# Patient Record
Sex: FEMALE | Race: BLACK OR AFRICAN AMERICAN | NOT HISPANIC OR LATINO
[De-identification: names, ages, dates, MRNs, and addresses within clinical notes are randomized per-mention and may not be internally consistent; named-entity substitution may affect disease eponyms.]

---

## 2017-01-05 ENCOUNTER — APPOINTMENT (OUTPATIENT)
Dept: CARDIOLOGY | Facility: CLINIC | Age: 37
End: 2017-01-05

## 2017-01-05 ENCOUNTER — NON-APPOINTMENT (OUTPATIENT)
Age: 37
End: 2017-01-05

## 2017-01-05 VITALS
DIASTOLIC BLOOD PRESSURE: 70 MMHG | BODY MASS INDEX: 27.48 KG/M2 | WEIGHT: 171 LBS | HEIGHT: 66 IN | SYSTOLIC BLOOD PRESSURE: 120 MMHG

## 2017-01-05 DIAGNOSIS — Z78.9 OTHER SPECIFIED HEALTH STATUS: ICD-10-CM

## 2017-01-05 DIAGNOSIS — I42.9 CARDIOMYOPATHY, UNSPECIFIED: ICD-10-CM

## 2017-01-05 DIAGNOSIS — I10 ESSENTIAL (PRIMARY) HYPERTENSION: ICD-10-CM

## 2017-01-06 ENCOUNTER — APPOINTMENT (OUTPATIENT)
Dept: CARDIOLOGY | Facility: CLINIC | Age: 37
End: 2017-01-06

## 2017-11-05 ENCOUNTER — RESULT REVIEW (OUTPATIENT)
Age: 37
End: 2017-11-05

## 2017-11-05 ENCOUNTER — EMERGENCY (EMERGENCY)
Facility: HOSPITAL | Age: 37
LOS: 1 days | Discharge: STILL ADMITTED OR ADMINISTRATIVE | End: 2017-11-05
Admitting: EMERGENCY MEDICINE

## 2017-11-05 ENCOUNTER — INPATIENT (INPATIENT)
Facility: HOSPITAL | Age: 37
LOS: 3 days | Discharge: HOME | End: 2017-11-09
Attending: OBSTETRICS & GYNECOLOGY | Admitting: OBSTETRICS & GYNECOLOGY
Payer: COMMERCIAL

## 2017-11-05 VITALS
RESPIRATION RATE: 16 BRPM | WEIGHT: 171.96 LBS | OXYGEN SATURATION: 100 % | HEIGHT: 66 IN | SYSTOLIC BLOOD PRESSURE: 112 MMHG | HEART RATE: 85 BPM | DIASTOLIC BLOOD PRESSURE: 72 MMHG | TEMPERATURE: 98 F

## 2017-11-05 DIAGNOSIS — O26.899 OTHER SPECIFIED PREGNANCY RELATED CONDITIONS, UNSPECIFIED TRIMESTER: ICD-10-CM

## 2017-11-05 DIAGNOSIS — Z3A.00 WEEKS OF GESTATION OF PREGNANCY NOT SPECIFIED: ICD-10-CM

## 2017-11-05 NOTE — ED ADULT TRIAGE NOTE - CHIEF COMPLAINT QUOTE
Pt is 25 weeks pregnant BREANNE feb 14th  with L lower quadrant pain and a feeling of "tightening of the uterus" which began last evening, able to tolerate PO, denies spotting, any leaking, N/V/D, denies any urinary symptoms, PMH, pregnancy induced cardiomyopathy, HTN

## 2017-11-06 VITALS — WEIGHT: 169.76 LBS | HEIGHT: 64 IN

## 2017-11-06 DIAGNOSIS — I10 ESSENTIAL (PRIMARY) HYPERTENSION: ICD-10-CM

## 2017-11-06 DIAGNOSIS — O45.90 PREMATURE SEPARATION OF PLACENTA, UNSPECIFIED, UNSPECIFIED TRIMESTER: ICD-10-CM

## 2017-11-06 DIAGNOSIS — I42.8 OTHER CARDIOMYOPATHIES: ICD-10-CM

## 2017-11-06 DIAGNOSIS — I81 PORTAL VEIN THROMBOSIS: ICD-10-CM

## 2017-11-06 DIAGNOSIS — I42.9 CARDIOMYOPATHY, UNSPECIFIED: ICD-10-CM

## 2017-11-06 LAB
ALBUMIN SERPL ELPH-MCNC: 3.3 G/DL — SIGNIFICANT CHANGE UP (ref 3.3–5)
ALP SERPL-CCNC: 62 U/L — SIGNIFICANT CHANGE UP (ref 40–120)
ALT FLD-CCNC: 13 U/L — SIGNIFICANT CHANGE UP (ref 4–33)
APPEARANCE UR: CLEAR — SIGNIFICANT CHANGE UP
APTT BLD: 26.1 SEC — LOW (ref 27.5–37.4)
AST SERPL-CCNC: 17 U/L — SIGNIFICANT CHANGE UP (ref 4–32)
BACTERIA # UR AUTO: SIGNIFICANT CHANGE UP
BASOPHILS # BLD AUTO: 0.01 K/UL — SIGNIFICANT CHANGE UP (ref 0–0.2)
BASOPHILS # BLD AUTO: 0.02 K/UL — SIGNIFICANT CHANGE UP (ref 0–0.2)
BASOPHILS NFR BLD AUTO: 0.1 % — SIGNIFICANT CHANGE UP (ref 0–2)
BASOPHILS NFR BLD AUTO: 0.3 % — SIGNIFICANT CHANGE UP (ref 0–2)
BILIRUB SERPL-MCNC: < 0.2 MG/DL — LOW (ref 0.2–1.2)
BILIRUB UR-MCNC: NEGATIVE — SIGNIFICANT CHANGE UP
BLD GP AB SCN SERPL QL: NEGATIVE — SIGNIFICANT CHANGE UP
BLOOD UR QL VISUAL: NEGATIVE — SIGNIFICANT CHANGE UP
BUN SERPL-MCNC: 6 MG/DL — LOW (ref 7–23)
CALCIUM SERPL-MCNC: 8.3 MG/DL — LOW (ref 8.4–10.5)
CHLORIDE SERPL-SCNC: 105 MMOL/L — SIGNIFICANT CHANGE UP (ref 98–107)
CHOLEST SERPL-MCNC: 242 MG/DL — HIGH (ref 120–199)
CO2 SERPL-SCNC: 19 MMOL/L — LOW (ref 22–31)
COLOR SPEC: SIGNIFICANT CHANGE UP
CREAT SERPL-MCNC: 0.45 MG/DL — LOW (ref 0.5–1.3)
EOSINOPHIL # BLD AUTO: 0 K/UL — SIGNIFICANT CHANGE UP (ref 0–0.5)
EOSINOPHIL # BLD AUTO: 0.07 K/UL — SIGNIFICANT CHANGE UP (ref 0–0.5)
EOSINOPHIL NFR BLD AUTO: 0 % — SIGNIFICANT CHANGE UP (ref 0–6)
EOSINOPHIL NFR BLD AUTO: 1.1 % — SIGNIFICANT CHANGE UP (ref 0–6)
FIBRINOGEN PPP-MCNC: 596.2 MG/DL — HIGH (ref 310–510)
GLUCOSE SERPL-MCNC: 137 MG/DL — HIGH (ref 70–99)
GLUCOSE UR-MCNC: NEGATIVE — SIGNIFICANT CHANGE UP
HBA1C BLD-MCNC: 5.4 % — SIGNIFICANT CHANGE UP (ref 4–5.6)
HCT VFR BLD CALC: 30.1 % — LOW (ref 34.5–45)
HCT VFR BLD CALC: 31.4 % — LOW (ref 34.5–45)
HDLC SERPL-MCNC: 111 MG/DL — HIGH (ref 45–65)
HGB BLD-MCNC: 10.3 G/DL — LOW (ref 11.5–15.5)
HGB BLD-MCNC: 10.6 G/DL — LOW (ref 11.5–15.5)
IMM GRANULOCYTES # BLD AUTO: 0.02 # — SIGNIFICANT CHANGE UP
IMM GRANULOCYTES # BLD AUTO: 0.03 # — SIGNIFICANT CHANGE UP
IMM GRANULOCYTES NFR BLD AUTO: 0.3 % — SIGNIFICANT CHANGE UP (ref 0–1.5)
IMM GRANULOCYTES NFR BLD AUTO: 0.3 % — SIGNIFICANT CHANGE UP (ref 0–1.5)
INR BLD: 0.94 — SIGNIFICANT CHANGE UP (ref 0.88–1.17)
KETONES UR-MCNC: NEGATIVE — SIGNIFICANT CHANGE UP
LEUKOCYTE ESTERASE UR-ACNC: HIGH
LIPID PNL WITH DIRECT LDL SERPL: 126 MG/DL — SIGNIFICANT CHANGE UP
LYMPHOCYTES # BLD AUTO: 0.7 K/UL — LOW (ref 1–3.3)
LYMPHOCYTES # BLD AUTO: 1.6 K/UL — SIGNIFICANT CHANGE UP (ref 1–3.3)
LYMPHOCYTES # BLD AUTO: 25.5 % — SIGNIFICANT CHANGE UP (ref 13–44)
LYMPHOCYTES # BLD AUTO: 7.2 % — LOW (ref 13–44)
MAGNESIUM SERPL-MCNC: 1.7 MG/DL — SIGNIFICANT CHANGE UP (ref 1.6–2.6)
MCHC RBC-ENTMCNC: 30.5 PG — SIGNIFICANT CHANGE UP (ref 27–34)
MCHC RBC-ENTMCNC: 30.6 PG — SIGNIFICANT CHANGE UP (ref 27–34)
MCHC RBC-ENTMCNC: 33.8 % — SIGNIFICANT CHANGE UP (ref 32–36)
MCHC RBC-ENTMCNC: 34.2 % — SIGNIFICANT CHANGE UP (ref 32–36)
MCV RBC AUTO: 89.1 FL — SIGNIFICANT CHANGE UP (ref 80–100)
MCV RBC AUTO: 90.8 FL — SIGNIFICANT CHANGE UP (ref 80–100)
MONOCYTES # BLD AUTO: 0.29 K/UL — SIGNIFICANT CHANGE UP (ref 0–0.9)
MONOCYTES # BLD AUTO: 0.59 K/UL — SIGNIFICANT CHANGE UP (ref 0–0.9)
MONOCYTES NFR BLD AUTO: 3 % — SIGNIFICANT CHANGE UP (ref 2–14)
MONOCYTES NFR BLD AUTO: 9.4 % — SIGNIFICANT CHANGE UP (ref 2–14)
MUCOUS THREADS # UR AUTO: SIGNIFICANT CHANGE UP
NEUTROPHILS # BLD AUTO: 3.98 K/UL — SIGNIFICANT CHANGE UP (ref 1.8–7.4)
NEUTROPHILS # BLD AUTO: 8.63 K/UL — HIGH (ref 1.8–7.4)
NEUTROPHILS NFR BLD AUTO: 63.4 % — SIGNIFICANT CHANGE UP (ref 43–77)
NEUTROPHILS NFR BLD AUTO: 89.4 % — HIGH (ref 43–77)
NITRITE UR-MCNC: NEGATIVE — SIGNIFICANT CHANGE UP
NRBC # FLD: 0 — SIGNIFICANT CHANGE UP
NRBC # FLD: 0 — SIGNIFICANT CHANGE UP
NT-PROBNP SERPL-SCNC: 19.14 PG/ML — SIGNIFICANT CHANGE UP
PH UR: 6.5 — SIGNIFICANT CHANGE UP (ref 4.6–8)
PHOSPHATE SERPL-MCNC: 3 MG/DL — SIGNIFICANT CHANGE UP (ref 2.5–4.5)
PLATELET # BLD AUTO: 200 K/UL — SIGNIFICANT CHANGE UP (ref 150–400)
PLATELET # BLD AUTO: 212 K/UL — SIGNIFICANT CHANGE UP (ref 150–400)
PMV BLD: 11.2 FL — SIGNIFICANT CHANGE UP (ref 7–13)
PMV BLD: 11.8 FL — SIGNIFICANT CHANGE UP (ref 7–13)
POTASSIUM SERPL-MCNC: 4.3 MMOL/L — SIGNIFICANT CHANGE UP (ref 3.5–5.3)
POTASSIUM SERPL-SCNC: 4.3 MMOL/L — SIGNIFICANT CHANGE UP (ref 3.5–5.3)
PROT SERPL-MCNC: 6.4 G/DL — SIGNIFICANT CHANGE UP (ref 6–8.3)
PROT UR-MCNC: NEGATIVE — SIGNIFICANT CHANGE UP
PROTHROM AB SERPL-ACNC: 10.5 SEC — SIGNIFICANT CHANGE UP (ref 9.8–13.1)
RBC # BLD: 3.38 M/UL — LOW (ref 3.8–5.2)
RBC # BLD: 3.46 M/UL — LOW (ref 3.8–5.2)
RBC # FLD: 13.2 % — SIGNIFICANT CHANGE UP (ref 10.3–14.5)
RBC # FLD: 13.4 % — SIGNIFICANT CHANGE UP (ref 10.3–14.5)
RBC CASTS # UR COMP ASSIST: SIGNIFICANT CHANGE UP (ref 0–?)
RH IG SCN BLD-IMP: POSITIVE — SIGNIFICANT CHANGE UP
SODIUM SERPL-SCNC: 137 MMOL/L — SIGNIFICANT CHANGE UP (ref 135–145)
SP GR SPEC: 1.01 — SIGNIFICANT CHANGE UP (ref 1–1.03)
SQUAMOUS # UR AUTO: SIGNIFICANT CHANGE UP
T PALLIDUM AB TITR SER: NEGATIVE — SIGNIFICANT CHANGE UP
TRIGL SERPL-MCNC: 69 MG/DL — SIGNIFICANT CHANGE UP (ref 10–149)
TSH SERPL-MCNC: 0.29 UIU/ML — SIGNIFICANT CHANGE UP (ref 0.27–4.2)
UROBILINOGEN FLD QL: NORMAL E.U. — SIGNIFICANT CHANGE UP (ref 0.1–0.2)
WBC # BLD: 6.28 K/UL — SIGNIFICANT CHANGE UP (ref 3.8–10.5)
WBC # BLD: 9.66 K/UL — SIGNIFICANT CHANGE UP (ref 3.8–10.5)
WBC # FLD AUTO: 6.28 K/UL — SIGNIFICANT CHANGE UP (ref 3.8–10.5)
WBC # FLD AUTO: 9.66 K/UL — SIGNIFICANT CHANGE UP (ref 3.8–10.5)
WBC UR QL: SIGNIFICANT CHANGE UP (ref 0–?)

## 2017-11-06 PROCEDURE — 93306 TTE W/DOPPLER COMPLETE: CPT | Mod: 26

## 2017-11-06 PROCEDURE — 93010 ELECTROCARDIOGRAM REPORT: CPT

## 2017-11-06 PROCEDURE — 88307 TISSUE EXAM BY PATHOLOGIST: CPT | Mod: 26

## 2017-11-06 RX ORDER — OXYCODONE HYDROCHLORIDE 5 MG/1
5 TABLET ORAL EVERY 4 HOURS
Qty: 0 | Refills: 0 | Status: DISCONTINUED | OUTPATIENT
Start: 2017-11-06 | End: 2017-11-09

## 2017-11-06 RX ORDER — OXYCODONE HYDROCHLORIDE 5 MG/1
5 TABLET ORAL
Qty: 0 | Refills: 0 | Status: DISCONTINUED | OUTPATIENT
Start: 2017-11-06 | End: 2017-11-09

## 2017-11-06 RX ORDER — HEPARIN SODIUM 5000 [USP'U]/ML
10000 INJECTION INTRAVENOUS; SUBCUTANEOUS EVERY 12 HOURS
Qty: 0 | Refills: 0 | Status: DISCONTINUED | OUTPATIENT
Start: 2017-11-06 | End: 2017-11-07

## 2017-11-06 RX ORDER — OXYCODONE HYDROCHLORIDE 5 MG/1
5 TABLET ORAL EVERY 4 HOURS
Qty: 0 | Refills: 0 | Status: DISCONTINUED | OUTPATIENT
Start: 2017-11-06 | End: 2017-11-06

## 2017-11-06 RX ORDER — AMPICILLIN TRIHYDRATE 250 MG
CAPSULE ORAL
Qty: 0 | Refills: 0 | Status: DISCONTINUED | OUTPATIENT
Start: 2017-11-06 | End: 2017-11-06

## 2017-11-06 RX ORDER — CARVEDILOL PHOSPHATE 80 MG/1
6.25 CAPSULE, EXTENDED RELEASE ORAL EVERY 12 HOURS
Qty: 0 | Refills: 0 | Status: DISCONTINUED | OUTPATIENT
Start: 2017-11-06 | End: 2017-11-09

## 2017-11-06 RX ORDER — SODIUM CHLORIDE 9 MG/ML
1000 INJECTION, SOLUTION INTRAVENOUS
Qty: 0 | Refills: 0 | Status: DISCONTINUED | OUTPATIENT
Start: 2017-11-06 | End: 2017-11-09

## 2017-11-06 RX ORDER — LABETALOL HCL 100 MG
200 TABLET ORAL
Qty: 0 | Refills: 0 | Status: DISCONTINUED | OUTPATIENT
Start: 2017-11-06 | End: 2017-11-06

## 2017-11-06 RX ORDER — IBUPROFEN 200 MG
600 TABLET ORAL EVERY 6 HOURS
Qty: 0 | Refills: 0 | Status: DISCONTINUED | OUTPATIENT
Start: 2017-11-06 | End: 2017-11-09

## 2017-11-06 RX ORDER — ONDANSETRON 8 MG/1
4 TABLET, FILM COATED ORAL ONCE
Qty: 0 | Refills: 0 | Status: COMPLETED | OUTPATIENT
Start: 2017-11-06 | End: 2017-11-06

## 2017-11-06 RX ORDER — SODIUM CHLORIDE 9 MG/ML
1000 INJECTION, SOLUTION INTRAVENOUS ONCE
Qty: 0 | Refills: 0 | Status: DISCONTINUED | OUTPATIENT
Start: 2017-11-06 | End: 2017-11-06

## 2017-11-06 RX ORDER — KETOROLAC TROMETHAMINE 30 MG/ML
30 SYRINGE (ML) INJECTION EVERY 6 HOURS
Qty: 0 | Refills: 0 | Status: DISCONTINUED | OUTPATIENT
Start: 2017-11-06 | End: 2017-11-07

## 2017-11-06 RX ORDER — TETANUS TOXOID, REDUCED DIPHTHERIA TOXOID AND ACELLULAR PERTUSSIS VACCINE, ADSORBED 5; 2.5; 8; 8; 2.5 [IU]/.5ML; [IU]/.5ML; UG/.5ML; UG/.5ML; UG/.5ML
0.5 SUSPENSION INTRAMUSCULAR ONCE
Qty: 0 | Refills: 0 | Status: COMPLETED | OUTPATIENT
Start: 2017-11-06

## 2017-11-06 RX ORDER — LANOLIN
1 OINTMENT (GRAM) TOPICAL
Qty: 0 | Refills: 0 | Status: DISCONTINUED | OUTPATIENT
Start: 2017-11-06 | End: 2017-11-09

## 2017-11-06 RX ORDER — CITRIC ACID/SODIUM CITRATE 300-500 MG
30 SOLUTION, ORAL ORAL ONCE
Qty: 0 | Refills: 0 | Status: DISCONTINUED | OUTPATIENT
Start: 2017-11-06 | End: 2017-11-06

## 2017-11-06 RX ORDER — AMPICILLIN TRIHYDRATE 250 MG
1 CAPSULE ORAL EVERY 4 HOURS
Qty: 0 | Refills: 0 | Status: DISCONTINUED | OUTPATIENT
Start: 2017-11-06 | End: 2017-11-06

## 2017-11-06 RX ORDER — LABETALOL HCL 100 MG
200 TABLET ORAL
Qty: 0 | Refills: 0 | Status: DISCONTINUED | OUTPATIENT
Start: 2017-11-06 | End: 2017-11-09

## 2017-11-06 RX ORDER — SODIUM CHLORIDE 9 MG/ML
500 INJECTION, SOLUTION INTRAVENOUS ONCE
Qty: 0 | Refills: 0 | Status: COMPLETED | OUTPATIENT
Start: 2017-11-06 | End: 2017-11-06

## 2017-11-06 RX ORDER — DOCUSATE SODIUM 100 MG
100 CAPSULE ORAL
Qty: 0 | Refills: 0 | Status: DISCONTINUED | OUTPATIENT
Start: 2017-11-06 | End: 2017-11-09

## 2017-11-06 RX ORDER — AMPICILLIN TRIHYDRATE 250 MG
2 CAPSULE ORAL ONCE
Qty: 0 | Refills: 0 | Status: DISCONTINUED | OUTPATIENT
Start: 2017-11-06 | End: 2017-11-06

## 2017-11-06 RX ORDER — MAGNESIUM SULFATE 500 MG/ML
4 VIAL (ML) INJECTION ONCE
Qty: 0 | Refills: 0 | Status: COMPLETED | OUTPATIENT
Start: 2017-11-06 | End: 2017-11-06

## 2017-11-06 RX ORDER — LABETALOL HCL 100 MG
1 TABLET ORAL
Qty: 0 | Refills: 0 | COMMUNITY

## 2017-11-06 RX ORDER — OXYTOCIN 10 UNIT/ML
333.33 VIAL (ML) INJECTION
Qty: 20 | Refills: 0 | Status: DISCONTINUED | OUTPATIENT
Start: 2017-11-06 | End: 2017-11-06

## 2017-11-06 RX ORDER — GLYCERIN ADULT
1 SUPPOSITORY, RECTAL RECTAL AT BEDTIME
Qty: 0 | Refills: 0 | Status: DISCONTINUED | OUTPATIENT
Start: 2017-11-06 | End: 2017-11-09

## 2017-11-06 RX ORDER — OXYCODONE HYDROCHLORIDE 5 MG/1
10 TABLET ORAL EVERY 4 HOURS
Qty: 0 | Refills: 0 | Status: DISCONTINUED | OUTPATIENT
Start: 2017-11-06 | End: 2017-11-06

## 2017-11-06 RX ORDER — FERROUS SULFATE 325(65) MG
325 TABLET ORAL DAILY
Qty: 0 | Refills: 0 | Status: DISCONTINUED | OUTPATIENT
Start: 2017-11-06 | End: 2017-11-09

## 2017-11-06 RX ORDER — SODIUM CHLORIDE 9 MG/ML
1000 INJECTION, SOLUTION INTRAVENOUS
Qty: 0 | Refills: 0 | Status: DISCONTINUED | OUTPATIENT
Start: 2017-11-06 | End: 2017-11-06

## 2017-11-06 RX ORDER — OXYTOCIN 10 UNIT/ML
166.67 VIAL (ML) INJECTION
Qty: 20 | Refills: 0 | Status: DISCONTINUED | OUTPATIENT
Start: 2017-11-06 | End: 2017-11-06

## 2017-11-06 RX ORDER — HEPARIN SODIUM 5000 [USP'U]/ML
1000 INJECTION INTRAVENOUS; SUBCUTANEOUS
Qty: 25000 | Refills: 0 | Status: DISCONTINUED | OUTPATIENT
Start: 2017-11-06 | End: 2017-11-06

## 2017-11-06 RX ORDER — METOCLOPRAMIDE HCL 10 MG
10 TABLET ORAL ONCE
Qty: 0 | Refills: 0 | Status: DISCONTINUED | OUTPATIENT
Start: 2017-11-06 | End: 2017-11-06

## 2017-11-06 RX ORDER — OXYTOCIN 10 UNIT/ML
41.67 VIAL (ML) INJECTION
Qty: 20 | Refills: 0 | Status: DISCONTINUED | OUTPATIENT
Start: 2017-11-06 | End: 2017-11-09

## 2017-11-06 RX ORDER — ACETAMINOPHEN 500 MG
650 TABLET ORAL EVERY 6 HOURS
Qty: 0 | Refills: 0 | Status: DISCONTINUED | OUTPATIENT
Start: 2017-11-06 | End: 2017-11-09

## 2017-11-06 RX ORDER — FAMOTIDINE 10 MG/ML
20 INJECTION INTRAVENOUS ONCE
Qty: 0 | Refills: 0 | Status: DISCONTINUED | OUTPATIENT
Start: 2017-11-06 | End: 2017-11-06

## 2017-11-06 RX ORDER — ACETAMINOPHEN 500 MG
975 TABLET ORAL EVERY 6 HOURS
Qty: 0 | Refills: 0 | Status: DISCONTINUED | OUTPATIENT
Start: 2017-11-06 | End: 2017-11-09

## 2017-11-06 RX ORDER — HEPARIN SODIUM 5000 [USP'U]/ML
10000 INJECTION INTRAVENOUS; SUBCUTANEOUS EVERY 12 HOURS
Qty: 0 | Refills: 0 | Status: DISCONTINUED | OUTPATIENT
Start: 2017-11-06 | End: 2017-11-06

## 2017-11-06 RX ORDER — ONDANSETRON 8 MG/1
4 TABLET, FILM COATED ORAL EVERY 6 HOURS
Qty: 0 | Refills: 0 | Status: DISCONTINUED | OUTPATIENT
Start: 2017-11-06 | End: 2017-11-06

## 2017-11-06 RX ORDER — IBUPROFEN 200 MG
600 TABLET ORAL EVERY 6 HOURS
Qty: 0 | Refills: 0 | Status: COMPLETED | OUTPATIENT
Start: 2017-11-06 | End: 2018-10-05

## 2017-11-06 RX ORDER — CARVEDILOL PHOSPHATE 80 MG/1
6.25 CAPSULE, EXTENDED RELEASE ORAL EVERY 12 HOURS
Qty: 0 | Refills: 0 | Status: DISCONTINUED | OUTPATIENT
Start: 2017-11-06 | End: 2017-11-06

## 2017-11-06 RX ORDER — DIPHENHYDRAMINE HCL 50 MG
25 CAPSULE ORAL EVERY 6 HOURS
Qty: 0 | Refills: 0 | Status: DISCONTINUED | OUTPATIENT
Start: 2017-11-06 | End: 2017-11-09

## 2017-11-06 RX ORDER — SIMETHICONE 80 MG/1
80 TABLET, CHEWABLE ORAL EVERY 4 HOURS
Qty: 0 | Refills: 0 | Status: DISCONTINUED | OUTPATIENT
Start: 2017-11-06 | End: 2017-11-09

## 2017-11-06 RX ADMIN — CARVEDILOL PHOSPHATE 6.25 MILLIGRAM(S): 80 CAPSULE, EXTENDED RELEASE ORAL at 10:44

## 2017-11-06 RX ADMIN — HEPARIN SODIUM 10000 UNIT(S): 5000 INJECTION INTRAVENOUS; SUBCUTANEOUS at 22:38

## 2017-11-06 RX ADMIN — OXYCODONE HYDROCHLORIDE 5 MILLIGRAM(S): 5 TABLET ORAL at 13:34

## 2017-11-06 RX ADMIN — OXYCODONE HYDROCHLORIDE 5 MILLIGRAM(S): 5 TABLET ORAL at 18:42

## 2017-11-06 RX ADMIN — OXYCODONE HYDROCHLORIDE 5 MILLIGRAM(S): 5 TABLET ORAL at 19:30

## 2017-11-06 RX ADMIN — Medication 650 MILLIGRAM(S): at 11:50

## 2017-11-06 RX ADMIN — SODIUM CHLORIDE 1000 MILLILITER(S): 9 INJECTION, SOLUTION INTRAVENOUS at 00:04

## 2017-11-06 RX ADMIN — Medication 975 MILLIGRAM(S): at 21:15

## 2017-11-06 RX ADMIN — HEPARIN SODIUM 10 UNIT(S)/HR: 5000 INJECTION INTRAVENOUS; SUBCUTANEOUS at 14:59

## 2017-11-06 RX ADMIN — CARVEDILOL PHOSPHATE 6.25 MILLIGRAM(S): 80 CAPSULE, EXTENDED RELEASE ORAL at 22:39

## 2017-11-06 RX ADMIN — Medication 650 MILLIGRAM(S): at 10:49

## 2017-11-06 RX ADMIN — Medication 200 MILLIGRAM(S): at 10:44

## 2017-11-06 RX ADMIN — Medication 600 MILLIGRAM(S): at 23:32

## 2017-11-06 RX ADMIN — Medication 975 MILLIGRAM(S): at 20:39

## 2017-11-06 RX ADMIN — Medication 12 MILLIGRAM(S): at 01:24

## 2017-11-06 RX ADMIN — OXYCODONE HYDROCHLORIDE 5 MILLIGRAM(S): 5 TABLET ORAL at 14:35

## 2017-11-06 RX ADMIN — ONDANSETRON 4 MILLIGRAM(S): 8 TABLET, FILM COATED ORAL at 04:50

## 2017-11-06 RX ADMIN — ONDANSETRON 4 MILLIGRAM(S): 8 TABLET, FILM COATED ORAL at 09:15

## 2017-11-06 RX ADMIN — SODIUM CHLORIDE 75 MILLILITER(S): 9 INJECTION, SOLUTION INTRAVENOUS at 01:34

## 2017-11-06 RX ADMIN — Medication 300 GRAM(S): at 01:20

## 2017-11-06 NOTE — CONSULT NOTE ADULT - SUBJECTIVE AND OBJECTIVE BOX
CHIEF COMPLAINT:  Abdominal pain     HISTORY OF PRESENT ILLNESS:   38 yo female well known to me from office  PMH preeclampsia 2015 w/ fetal demise, amisha-partum CM (which later recovered 6 months post fetal demise), left portal vein thrombosis on Lovenox. presented w/ LLQ pain and cramps and Found to be 1cm dilated and urgent  done @ 25wks confirming placental abruption.   Her most recent echo from 2 months ago in office revealed dilated LV with LVEF 45-50%, a significant change from her previous echo.  She was started on coreg. The option of terminating the pregnancy and all the risks involved with carrying the pregnancy was discussed with the patient and she wanted to continue with it.     Currently in CCU. States to be doing ok. No chest pain or sob. Some abdominal pain and nausea.       Heart failure  Pre-eclampsia  Cardiomyopathy  Portal vein thrombosis  Cervical dysplasia: treated with cyro-therapy- 2013  HTN (hypertension)  Hypertrophy of breast  S/P bilateral breast reduction:   No Past Surgical History          MEDICATIONS:  carvedilol 6.25 milliGRAM(s) Oral every 12 hours  labetalol 200 milliGRAM(s) Oral two times a day        acetaminophen   Tablet. 650 milliGRAM(s) Oral every 6 hours PRN  ondansetron    Tablet 4 milliGRAM(s) Oral every 6 hours PRN  oxyCODONE    IR 5 milliGRAM(s) Oral every 4 hours PRN            FAMILY HISTORY:  Family history of hypertension in father  Family history of hypertension in mother  Family history of diabetes mellitus in mother (Mother)      SOCIAL HISTORY:    [ ] Non-smoker  [ ] Smoker  [ ] Alcohol    Allergies    No Known Allergies    Intolerances    	    REVIEW OF SYSTEMS:  CONSTITUTIONAL: No fever, weight loss, or fatigue  EYES: No eye pain, visual disturbances, or discharge  ENMT:  No difficulty hearing, tinnitus, vertigo; No sinus or throat pain  NECK: No pain or stiffness  RESPIRATORY: No cough, wheezing, chills or hemoptysis; No Shortness of Breath  CARDIOVASCULAR: No chest pain, palpitations, passing out, dizziness, or leg swelling  GASTROINTESTINAL: No abdominal or epigastric pain. No nausea, vomiting, or hematemesis; No diarrhea or constipation. No melena or hematochezia.  GENITOURINARY: No dysuria, frequency, hematuria, or incontinence  NEUROLOGICAL: No headaches, memory loss, loss of strength, numbness, or tremors  SKIN: No itching, burning, rashes, or lesions   LYMPH Nodes: No enlarged glands  ENDOCRINE: No heat or cold intolerance; No hair loss  MUSCULOSKELETAL: No joint pain or swelling; No muscle, back, or extremity pain  PSYCHIATRIC: No depression, anxiety, mood swings, or difficulty sleeping  HEME/LYMPH: No easy bruising, or bleeding gums  ALLERY AND IMMUNOLOGIC: No hives or eczema	    [ ] All others negative	  [ ] Unable to obtain    PHYSICAL EXAM:  T(C): 36.5 (17 @ 07:55), Max: 36.7 (17 @ 22:35)  HR: 75 (17 @ 08:00) (64 - 88)  BP: 109/62 (17 @ 04:30) (109/62 - 112/72)  RR: 21 (17 @ 08:00) (16 - 22)  SpO2: 99% (17 @ 08:00) (97% - 100%)  Wt(kg): --  I&O's Summary    2017 07:01  -  2017 07:00  --------------------------------------------------------  IN: 0 mL / OUT: 350 mL / NET: -350 mL    2017 07:01  -  2017 08:33  --------------------------------------------------------  IN: 0 mL / OUT: 125 mL / NET: -125 mL        Appearance: Normal	  HEENT:   Normal oral mucosa, PERRL, EOMI	  Lymphatic: No lymphadenopathy  Cardiovascular: Normal S1 S2, No JVD, No murmurs, No edema  Respiratory: Lungs clear to auscultation	  Psychiatry: A & O x 3, Mood & affect appropriate  Gastrointestinal:  Soft, Non-tender, + BS	  Skin: No rashes, No ecchymoses, No cyanosis	  Neurologic: Non-focal  Extremities: Normal range of motion, No clubbing, cyanosis or edema  Vascular: Peripheral pulses palpable 2+ bilaterally    TELEMETRY: SR	    ECG:  	  RADIOLOGY:    OTHER: 	  	  LABS:	 	    CARDIAC MARKERS:                                  10.6   6.28  )-----------( 212      ( 2017 01:30 )             31.4           proBNP:   Lipid Profile:   HgA1c:   TSH:

## 2017-11-06 NOTE — H&P ADULT - ASSESSMENT
36-yo female  PMH preeclampsia  w/ fetal demise, amisha-partum CM, HFrEF 35%, left portal vein thrombosis on Lovenox. presents w/ LLQ pain and cramps. Found to be 1cm dilated and urgent  done @ 25wks. confirming placental abruption.    Normal echo 2017 by cardiologist Dr Jorge Isaac. Repeat echo in 2017 showed dilated LV and EF40%  amisah-partum Cardiomyopathy  States he is compliant with medications, low salt diet and fluid restrictions. Has used Bipap in the past. Does not use home CPAP. No H/O intubation   Pt. w/ bilalateral rales, SOB     Admit to telemetry  Admission labs to include CMP, Mag, phos, CBC, coags, A1c, pBNP, TSH, -CXR,   Heart failure core measures  TTE for LV function and WMA  Strict I/Os and standing daily weights  Continue milrinone gtt, increase infusion to 0.375mcg/kg/min  initiate lasix gtt @10 mg/ hr.  Increase Lipitor to 80 mg QHS  lasix 40mg BID   Trend BMP 2/2 diuresis and replete as needed, k>4, mg> 2  Bipap 10/5 40%   hold BB at this time, patient r/o ADHF. 36-yo female  PMH preeclampsia  w/ fetal demise, amisha-partum CM, HFrEF 35%, left portal vein thrombosis on Lovenox. presents w/ LLQ pain and cramps. Found to be 1cm dilated and urgent  done @ 25wks. confirming placental abruption.

## 2017-11-06 NOTE — H&P ADULT - HISTORY OF PRESENT ILLNESS
36-yo female  PMH preeclampsia  w/ fetal demise, amisha-partum CM, HFrEF 35%, left portal vein thrombosis on Lovenox. presents w/ LLQ pain and cramps. Found to be 1cm dilated and urgent  done @ 25wks. confirming placental abruption.    Normal echo 2017 by cardiologist Dr Jorge Isaac. Repeat echo in 2017 showed dilated LV and EF40% 36-yo female  PMH preeclampsia  w/ fetal demise, amisha-partum CM, HFrEF 35%, left portal vein thrombosis on Lovenox. presents w/ LLQ pain and cramps. Found to be 1cm dilated and urgent  done @ 25wks confirming placental abruption.    Normal echo 2017 by cardiologist Dr Jorge Isaac. Repeat echo in 2017 showed dilated LV and EF40%

## 2017-11-06 NOTE — H&P ADULT - FAMILY HISTORY
No pertinent family history in first degree relatives Family history of hypertension in mother     Family history of hypertension in father     Mother  Still living? Unknown  Family history of diabetes mellitus in mother, Age at diagnosis: Age Unknown

## 2017-11-06 NOTE — PROGRESS NOTE ADULT - PROBLEM SELECTOR PLAN 2
- Reg diet  - HSQ for DVT ppx  - d/c streeter tomorrow  - Ambulate as tolerated  - Pad count  - fundal checks - Reg diet  - recommend HSQ or Lovenox for DVT ppx  - d/c streeter tomorrow  - Ambulate as tolerated  - Pad count  - fundal checks

## 2017-11-06 NOTE — H&P ADULT - PROBLEM SELECTOR PLAN 2
Problem: Hypertension  Continue Home medications  DASH consistent carbohydrate diet  f/u BMP w/ mg and phos, CBC, TSH.

## 2017-11-06 NOTE — DISCHARGE NOTE ADULT - HOSPITAL COURSE
35 yo female  PMH preeclampsia  w/ fetal demise, amisha-partum CM, HFrEF 35%, hx of left portal vein thrombosis on Lovenox presents w/ LLQ pain and cramps. Found to be 1cm dilated and urgent  done @ 25wks. Placental aburption was confirmed.  Patient was sent to the CCU for management because of EF 40% on echo. Patient had a normal echo 2017 by cardiologist Dr. Jorge Isaac. Repeat echo in 2017 showed dilated LV and EF40%.  Patient got a repeat echo in the hospital which showed EF of 52%.  Patient was started on a heparin drip in the CCU and changed to 10,000 units heparin subq when transferred to the floors.

## 2017-11-06 NOTE — H&P ADULT - PMH
Cervical dysplasia  treated with cyro-therapy- 06/2013  HTN (hypertension)    Hypertrophy of breast Cardiomyopathy    Cervical dysplasia  treated with cyro-therapy- 06/2013  Heart failure    HTN (hypertension)    Hypertrophy of breast    Portal vein thrombosis    Pre-eclampsia

## 2017-11-06 NOTE — CONSULT NOTE ADULT - ASSESSMENT
36 yo female with history of peripartum cardiomyopathy admitted to CCU s/p emergency  for placental rupture

## 2017-11-06 NOTE — H&P ADULT - PROBLEM SELECTOR PLAN 1
Problem: amisha-partum Cardiomyopathy  Normal echo April 2017 by cardiologist Dr Jorge Isaac. Repeat echo in Sept 2017 showed dilated LV and EF40%  States she is compliant with medications.  Admit to CCU  Admission labs to include CMP, Mag, phos, CBC, coags, A1c, pBNP, TSH, -CXR,   TTE for LV function and WMA  Strict I/Os and standing daily weights  Continue Home medications

## 2017-11-06 NOTE — DISCHARGE NOTE ADULT - PATIENT PORTAL LINK FT
“You can access the [employer] Patient Portal, offered by [facility], by registering with the following website: [email or web address]”

## 2017-11-06 NOTE — H&P ADULT - NEGATIVE CARDIOVASCULAR SYMPTOMS
no chest pain/no dyspnea on exertion/no orthopnea/no peripheral edema/no paroxysmal nocturnal dyspnea/no palpitations

## 2017-11-07 DIAGNOSIS — O34.211 MATERNAL CARE FOR LOW TRANSVERSE SCAR FROM PREVIOUS CESAREAN DELIVERY: ICD-10-CM

## 2017-11-07 DIAGNOSIS — I42.9 CARDIOMYOPATHY, UNSPECIFIED: ICD-10-CM

## 2017-11-07 LAB
BACTERIA UR CULT: SIGNIFICANT CHANGE UP
BUN SERPL-MCNC: 7 MG/DL — SIGNIFICANT CHANGE UP (ref 7–23)
CALCIUM SERPL-MCNC: 9.1 MG/DL — SIGNIFICANT CHANGE UP (ref 8.4–10.5)
CHLORIDE SERPL-SCNC: 102 MMOL/L — SIGNIFICANT CHANGE UP (ref 98–107)
CO2 SERPL-SCNC: 21 MMOL/L — LOW (ref 22–31)
CREAT SERPL-MCNC: 0.66 MG/DL — SIGNIFICANT CHANGE UP (ref 0.5–1.3)
GLUCOSE SERPL-MCNC: 109 MG/DL — HIGH (ref 70–99)
HCT VFR BLD CALC: 29.3 % — LOW (ref 34.5–45)
HGB BLD-MCNC: 9.8 G/DL — LOW (ref 11.5–15.5)
MAGNESIUM SERPL-MCNC: 1.9 MG/DL — SIGNIFICANT CHANGE UP (ref 1.6–2.6)
MCHC RBC-ENTMCNC: 30.7 PG — SIGNIFICANT CHANGE UP (ref 27–34)
MCHC RBC-ENTMCNC: 33.4 % — SIGNIFICANT CHANGE UP (ref 32–36)
MCV RBC AUTO: 91.8 FL — SIGNIFICANT CHANGE UP (ref 80–100)
NRBC # FLD: 0 — SIGNIFICANT CHANGE UP
PHOSPHATE SERPL-MCNC: 3.6 MG/DL — SIGNIFICANT CHANGE UP (ref 2.5–4.5)
PLATELET # BLD AUTO: 220 K/UL — SIGNIFICANT CHANGE UP (ref 150–400)
PMV BLD: 11.4 FL — SIGNIFICANT CHANGE UP (ref 7–13)
POTASSIUM SERPL-MCNC: 4.1 MMOL/L — SIGNIFICANT CHANGE UP (ref 3.5–5.3)
POTASSIUM SERPL-SCNC: 4.1 MMOL/L — SIGNIFICANT CHANGE UP (ref 3.5–5.3)
RBC # BLD: 3.19 M/UL — LOW (ref 3.8–5.2)
RBC # FLD: 13.2 % — SIGNIFICANT CHANGE UP (ref 10.3–14.5)
SODIUM SERPL-SCNC: 135 MMOL/L — SIGNIFICANT CHANGE UP (ref 135–145)
SPECIMEN SOURCE: SIGNIFICANT CHANGE UP
SPECIMEN SOURCE: SIGNIFICANT CHANGE UP
WBC # BLD: 11.11 K/UL — HIGH (ref 3.8–10.5)
WBC # FLD AUTO: 11.11 K/UL — HIGH (ref 3.8–10.5)

## 2017-11-07 RX ORDER — ENOXAPARIN SODIUM 100 MG/ML
40 INJECTION SUBCUTANEOUS DAILY
Qty: 0 | Refills: 0 | Status: DISCONTINUED | OUTPATIENT
Start: 2017-11-07 | End: 2017-11-09

## 2017-11-07 RX ADMIN — Medication 600 MILLIGRAM(S): at 07:13

## 2017-11-07 RX ADMIN — OXYCODONE HYDROCHLORIDE 5 MILLIGRAM(S): 5 TABLET ORAL at 10:59

## 2017-11-07 RX ADMIN — Medication 975 MILLIGRAM(S): at 21:03

## 2017-11-07 RX ADMIN — Medication 600 MILLIGRAM(S): at 21:02

## 2017-11-07 RX ADMIN — Medication 100 MILLIGRAM(S): at 13:27

## 2017-11-07 RX ADMIN — Medication 975 MILLIGRAM(S): at 14:14

## 2017-11-07 RX ADMIN — Medication 600 MILLIGRAM(S): at 21:53

## 2017-11-07 RX ADMIN — OXYCODONE HYDROCHLORIDE 5 MILLIGRAM(S): 5 TABLET ORAL at 10:27

## 2017-11-07 RX ADMIN — OXYCODONE HYDROCHLORIDE 5 MILLIGRAM(S): 5 TABLET ORAL at 06:22

## 2017-11-07 RX ADMIN — OXYCODONE HYDROCHLORIDE 5 MILLIGRAM(S): 5 TABLET ORAL at 23:27

## 2017-11-07 RX ADMIN — Medication 975 MILLIGRAM(S): at 06:22

## 2017-11-07 RX ADMIN — Medication 600 MILLIGRAM(S): at 13:28

## 2017-11-07 RX ADMIN — Medication 1 TABLET(S): at 13:27

## 2017-11-07 RX ADMIN — Medication 200 MILLIGRAM(S): at 22:56

## 2017-11-07 RX ADMIN — CARVEDILOL PHOSPHATE 6.25 MILLIGRAM(S): 80 CAPSULE, EXTENDED RELEASE ORAL at 22:56

## 2017-11-07 RX ADMIN — Medication 600 MILLIGRAM(S): at 06:22

## 2017-11-07 RX ADMIN — OXYCODONE HYDROCHLORIDE 5 MILLIGRAM(S): 5 TABLET ORAL at 07:14

## 2017-11-07 RX ADMIN — Medication 975 MILLIGRAM(S): at 13:27

## 2017-11-07 RX ADMIN — Medication 325 MILLIGRAM(S): at 13:27

## 2017-11-07 RX ADMIN — CARVEDILOL PHOSPHATE 6.25 MILLIGRAM(S): 80 CAPSULE, EXTENDED RELEASE ORAL at 10:21

## 2017-11-07 RX ADMIN — Medication 600 MILLIGRAM(S): at 00:15

## 2017-11-07 RX ADMIN — Medication 200 MILLIGRAM(S): at 00:15

## 2017-11-07 RX ADMIN — OXYCODONE HYDROCHLORIDE 5 MILLIGRAM(S): 5 TABLET ORAL at 14:14

## 2017-11-07 RX ADMIN — ENOXAPARIN SODIUM 40 MILLIGRAM(S): 100 INJECTION SUBCUTANEOUS at 10:21

## 2017-11-07 RX ADMIN — Medication 975 MILLIGRAM(S): at 07:14

## 2017-11-07 RX ADMIN — OXYCODONE HYDROCHLORIDE 5 MILLIGRAM(S): 5 TABLET ORAL at 22:56

## 2017-11-07 RX ADMIN — Medication 600 MILLIGRAM(S): at 14:14

## 2017-11-07 RX ADMIN — Medication 200 MILLIGRAM(S): at 13:27

## 2017-11-07 RX ADMIN — Medication 975 MILLIGRAM(S): at 22:01

## 2017-11-07 RX ADMIN — OXYCODONE HYDROCHLORIDE 5 MILLIGRAM(S): 5 TABLET ORAL at 13:34

## 2017-11-08 LAB — GP B STREP GENITAL QL CULT: SIGNIFICANT CHANGE UP

## 2017-11-08 RX ORDER — TETANUS TOXOID, REDUCED DIPHTHERIA TOXOID AND ACELLULAR PERTUSSIS VACCINE, ADSORBED 5; 2.5; 8; 8; 2.5 [IU]/.5ML; [IU]/.5ML; UG/.5ML; UG/.5ML; UG/.5ML
0.5 SUSPENSION INTRAMUSCULAR ONCE
Qty: 0 | Refills: 0 | Status: COMPLETED | OUTPATIENT
Start: 2017-11-08 | End: 2017-11-08

## 2017-11-08 RX ADMIN — Medication 975 MILLIGRAM(S): at 21:13

## 2017-11-08 RX ADMIN — Medication 600 MILLIGRAM(S): at 21:12

## 2017-11-08 RX ADMIN — TETANUS TOXOID, REDUCED DIPHTHERIA TOXOID AND ACELLULAR PERTUSSIS VACCINE, ADSORBED 0.5 MILLILITER(S): 5; 2.5; 8; 8; 2.5 SUSPENSION INTRAMUSCULAR at 23:19

## 2017-11-08 RX ADMIN — SIMETHICONE 80 MILLIGRAM(S): 80 TABLET, CHEWABLE ORAL at 06:38

## 2017-11-08 RX ADMIN — OXYCODONE HYDROCHLORIDE 5 MILLIGRAM(S): 5 TABLET ORAL at 13:41

## 2017-11-08 RX ADMIN — OXYCODONE HYDROCHLORIDE 5 MILLIGRAM(S): 5 TABLET ORAL at 06:38

## 2017-11-08 RX ADMIN — Medication 200 MILLIGRAM(S): at 10:30

## 2017-11-08 RX ADMIN — Medication 100 MILLIGRAM(S): at 10:41

## 2017-11-08 RX ADMIN — OXYCODONE HYDROCHLORIDE 5 MILLIGRAM(S): 5 TABLET ORAL at 07:30

## 2017-11-08 RX ADMIN — Medication 600 MILLIGRAM(S): at 06:38

## 2017-11-08 RX ADMIN — Medication 975 MILLIGRAM(S): at 14:30

## 2017-11-08 RX ADMIN — OXYCODONE HYDROCHLORIDE 5 MILLIGRAM(S): 5 TABLET ORAL at 14:30

## 2017-11-08 RX ADMIN — OXYCODONE HYDROCHLORIDE 5 MILLIGRAM(S): 5 TABLET ORAL at 21:42

## 2017-11-08 RX ADMIN — Medication 975 MILLIGRAM(S): at 21:43

## 2017-11-08 RX ADMIN — OXYCODONE HYDROCHLORIDE 5 MILLIGRAM(S): 5 TABLET ORAL at 21:12

## 2017-11-08 RX ADMIN — Medication 100 MILLIGRAM(S): at 19:11

## 2017-11-08 RX ADMIN — SIMETHICONE 80 MILLIGRAM(S): 80 TABLET, CHEWABLE ORAL at 19:14

## 2017-11-08 RX ADMIN — ENOXAPARIN SODIUM 40 MILLIGRAM(S): 100 INJECTION SUBCUTANEOUS at 10:30

## 2017-11-08 RX ADMIN — Medication 600 MILLIGRAM(S): at 13:42

## 2017-11-08 RX ADMIN — SIMETHICONE 80 MILLIGRAM(S): 80 TABLET, CHEWABLE ORAL at 10:43

## 2017-11-08 RX ADMIN — CARVEDILOL PHOSPHATE 6.25 MILLIGRAM(S): 80 CAPSULE, EXTENDED RELEASE ORAL at 21:33

## 2017-11-08 RX ADMIN — Medication 975 MILLIGRAM(S): at 06:38

## 2017-11-08 RX ADMIN — Medication 975 MILLIGRAM(S): at 07:30

## 2017-11-08 RX ADMIN — Medication 600 MILLIGRAM(S): at 21:43

## 2017-11-08 RX ADMIN — CARVEDILOL PHOSPHATE 6.25 MILLIGRAM(S): 80 CAPSULE, EXTENDED RELEASE ORAL at 10:30

## 2017-11-08 RX ADMIN — Medication 600 MILLIGRAM(S): at 07:30

## 2017-11-08 RX ADMIN — Medication 325 MILLIGRAM(S): at 10:30

## 2017-11-08 RX ADMIN — Medication 975 MILLIGRAM(S): at 13:42

## 2017-11-08 RX ADMIN — Medication 600 MILLIGRAM(S): at 16:56

## 2017-11-08 RX ADMIN — Medication 200 MILLIGRAM(S): at 21:33

## 2017-11-08 NOTE — PROGRESS NOTE ADULT - PROBLEM SELECTOR PLAN 2
- appreciate cards reccs  - continue labetalol and carvedilol  - Lovenox 40daily for hx of portal vein thrombosis

## 2017-11-09 ENCOUNTER — TRANSCRIPTION ENCOUNTER (OUTPATIENT)
Age: 37
End: 2017-11-09

## 2017-11-09 VITALS — SYSTOLIC BLOOD PRESSURE: 118 MMHG | HEART RATE: 71 BPM | OXYGEN SATURATION: 100 % | DIASTOLIC BLOOD PRESSURE: 73 MMHG

## 2017-11-09 LAB — SURGICAL PATHOLOGY STUDY: SIGNIFICANT CHANGE UP

## 2017-11-09 RX ORDER — ACETAMINOPHEN 500 MG
2 TABLET ORAL
Qty: 0 | Refills: 0 | COMMUNITY
Start: 2017-11-09

## 2017-11-09 RX ORDER — OXYCODONE HYDROCHLORIDE 5 MG/1
1 TABLET ORAL
Qty: 20 | Refills: 0 | OUTPATIENT
Start: 2017-11-09

## 2017-11-09 RX ORDER — ACETAMINOPHEN 500 MG
3 TABLET ORAL
Qty: 0 | Refills: 0 | COMMUNITY
Start: 2017-11-09

## 2017-11-09 RX ORDER — ENOXAPARIN SODIUM 100 MG/ML
40 INJECTION SUBCUTANEOUS
Qty: 50 | Refills: 0 | OUTPATIENT
Start: 2017-11-09 | End: 2017-12-29

## 2017-11-09 RX ORDER — CARVEDILOL PHOSPHATE 80 MG/1
1 CAPSULE, EXTENDED RELEASE ORAL
Qty: 60 | Refills: 0 | OUTPATIENT
Start: 2017-11-09 | End: 2017-12-09

## 2017-11-09 RX ORDER — ENOXAPARIN SODIUM 100 MG/ML
0 INJECTION SUBCUTANEOUS
Qty: 0 | Refills: 0 | COMMUNITY

## 2017-11-09 RX ORDER — IBUPROFEN 200 MG
1 TABLET ORAL
Qty: 0 | Refills: 0 | COMMUNITY
Start: 2017-11-09

## 2017-11-09 RX ADMIN — OXYCODONE HYDROCHLORIDE 5 MILLIGRAM(S): 5 TABLET ORAL at 05:32

## 2017-11-09 RX ADMIN — Medication 200 MILLIGRAM(S): at 10:11

## 2017-11-09 RX ADMIN — CARVEDILOL PHOSPHATE 6.25 MILLIGRAM(S): 80 CAPSULE, EXTENDED RELEASE ORAL at 10:11

## 2017-11-09 RX ADMIN — ENOXAPARIN SODIUM 40 MILLIGRAM(S): 100 INJECTION SUBCUTANEOUS at 10:12

## 2017-11-09 RX ADMIN — Medication 325 MILLIGRAM(S): at 12:37

## 2017-11-09 RX ADMIN — OXYCODONE HYDROCHLORIDE 5 MILLIGRAM(S): 5 TABLET ORAL at 12:37

## 2017-11-09 RX ADMIN — OXYCODONE HYDROCHLORIDE 5 MILLIGRAM(S): 5 TABLET ORAL at 06:02

## 2017-11-09 RX ADMIN — Medication 975 MILLIGRAM(S): at 06:02

## 2017-11-09 RX ADMIN — Medication 975 MILLIGRAM(S): at 05:32

## 2017-11-09 RX ADMIN — Medication 600 MILLIGRAM(S): at 05:32

## 2017-11-09 RX ADMIN — Medication 600 MILLIGRAM(S): at 12:37

## 2017-11-09 RX ADMIN — Medication 975 MILLIGRAM(S): at 12:37

## 2017-11-09 RX ADMIN — Medication 600 MILLIGRAM(S): at 06:02

## 2017-11-09 NOTE — PROGRESS NOTE ADULT - PROBLEM SELECTOR PROBLEM 1
delivery delivered
Other cardiomyopathy

## 2017-11-09 NOTE — DISCHARGE NOTE OB - CARE PLAN
Principal Discharge DX:	 delivery delivered  Goal:	baseline health  Instructions for follow-up, activity and diet:	reg diet, ambulating, pain control

## 2017-11-09 NOTE — PROVIDER CONTACT NOTE (OTHER) - ACTION/TREATMENT ORDERED:
Md Skaggs notified.  Giles to bedside to assess patient. No new orders at this time.  Continue to assess.

## 2017-11-09 NOTE — PROGRESS NOTE ADULT - PROBLEM SELECTOR PROBLEM 2
Other cardiomyopathy
 delivery delivered
Cardiomyopathy, unspecified type
Other cardiomyopathy
Other cardiomyopathy

## 2017-11-09 NOTE — DISCHARGE NOTE OB - CARE PROVIDER_API CALL
Raj Mcmillan (SHANELLE), MaternalFetal Medicine; Obstetrics and Gynecology  [address] [place] [address]  [address]  [place] [address]  Phone: [phone]  Fax: [phone]

## 2017-11-09 NOTE — DISCHARGE NOTE OB - PATIENT PORTAL LINK FT
“You can access the [employer] Patient Portal, offered by [facility], by registering with the following website: [email or web address]”

## 2017-11-09 NOTE — DISCHARGE NOTE OB - HOSPITAL COURSE
36yo  presented to D on  with abdominal pain and fetus found to be in distress. She underwent a classical  at 25w5d. Due to her history of cardiomyopathy in previous pregnancy, she was transferred to CCU for observation postpartum. She remained stable and had an ECHO performed on  which demonstrated an EF of 52%. She remained on labetalol and carvedilol during her hospital stay. She remained on Lovenox 40qd for her history of portal vein thrombosis. She was discharged in stable condition on POD#3.

## 2017-11-09 NOTE — PROGRESS NOTE ADULT - SUBJECTIVE AND OBJECTIVE BOX
Subjective: Patient seen and examined. No new events except as noted.   abdominal pain  moved out of CCU  no CP or SOB     REVIEW OF SYSTEMS:    CONSTITUTIONAL: + weakness, fevers or chills  EYES/ENT: No visual changes;  No vertigo or throat pain   NECK: No pain or stiffness  RESPIRATORY: No cough, wheezing, hemoptysis; No shortness of breath  CARDIOVASCULAR: No chest pain or palpitations  GASTROINTESTINAL: + abdominal or epigastric pain. No nausea, vomiting, or hematemesis; No diarrhea or constipation. No melena or hematochezia.  GENITOURINARY: No dysuria, frequency or hematuria  NEUROLOGICAL: No numbness or weakness  SKIN: No itching, burning, rashes, or lesions   All other review of systems is negative unless indicated above.    MEDICATIONS:  MEDICATIONS  (STANDING):  acetaminophen   Tablet. 975 milliGRAM(s) Oral every 6 hours  carvedilol 6.25 milliGRAM(s) Oral every 12 hours  diphtheria/tetanus/pertussis (acellular) Vaccine (ADAcel) 0.5 milliLiter(s) IntraMuscular once  enoxaparin Injectable 40 milliGRAM(s) SubCutaneous daily  ferrous    sulfate 325 milliGRAM(s) Oral daily  ibuprofen  Tablet 600 milliGRAM(s) Oral every 6 hours  ketorolac   Injectable 30 milliGRAM(s) IV Push every 6 hours  labetalol 200 milliGRAM(s) Oral two times a day  lactated ringers. 1000 milliLiter(s) (75 mL/Hr) IV Continuous <Continuous>  oxyCODONE    IR 5 milliGRAM(s) Oral every 3 hours  oxyCODONE    IR 5 milliGRAM(s) Oral every 3 hours  oxytocin Infusion 41.667 milliUNIT(s)/Min (125 mL/Hr) IV Continuous <Continuous>  prenatal multivitamin 1 Tablet(s) Oral daily      PHYSICAL EXAM:  T(C): 36.7 (11-07-17 @ 10:03), Max: 37.2 (11-06-17 @ 16:00)  HR: 82 (11-07-17 @ 10:03) (73 - 93)  BP: 104/54 (11-07-17 @ 10:03) (101/61 - 115/71)  RR: 18 (11-07-17 @ 10:03) (16 - 25)  SpO2: 99% (11-07-17 @ 10:03) (97% - 100%)  Wt(kg): --  I&O's Summary    06 Nov 2017 07:01  -  07 Nov 2017 07:00  --------------------------------------------------------  IN: 570 mL / OUT: 1225 mL / NET: -655 mL    07 Nov 2017 07:01  -  07 Nov 2017 11:43  --------------------------------------------------------  IN: 0 mL / OUT: 1000 mL / NET: -1000 mL          Appearance: Normal	  HEENT:   Normal oral mucosa, PERRL, EOMI	  Lymphatic: No lymphadenopathy , no edema  Cardiovascular: Normal S1 S2, No JVD, No murmurs , Peripheral pulses palpable 2+ bilaterally  Respiratory: Lungs clear to auscultation, normal effort 	  Gastrointestinal:  Soft, +tender, + BS	  Skin: No rashes, No ecchymoses, No cyanosis, warm to touch  Musculoskeletal: Normal range of motion, normal strength  Psychiatry:  Mood & affect appropriate  Ext: No edema      LABS:    CARDIAC MARKERS:                                9.8    11.11 )-----------( 220      ( 07 Nov 2017 05:30 )             29.3     11-07    135  |  102  |  7   ----------------------------<  109<H>  4.1   |  21<L>  |  0.66    Ca    9.1      07 Nov 2017 05:30  Phos  3.6     11-07  Mg     1.9     11-07    TPro  6.4  /  Alb  3.3  /  TBili  < 0.2<L>  /  DBili  x   /  AST  17  /  ALT  13  /  AlkPhos  62  11-06    proBNP:   Lipid Profile:   HgA1c:   TSH:     NEGATIVE          TELEMETRY: 	SR    ECG:  	  RADIOLOGY:   DIAGNOSTIC TESTING:  [ ] Echocardiogram:  < from: Transthoracic Echocardiogram (11.06.17 @ 10:55) >  PROCEDURE: Transthoracic echocardiogram with 2-D, M-Mode  and complete spectral and color flow Doppler.  INDICATION: Cardiomyopathy, unspecified (I42.9)  ------------------------------------------------------------------------  DIMENSIONS:  Dimensions:     Normal Values:  LA:     2.9 cm    2.0 - 4.0 cm  Ao:     2.7 cm    2.0 - 3.8 cm  SEPTUM: 0.7 cm    0.6 - 1.2 cm  PWT:  0.7 cm    0.6 - 1.1 cm  LVIDd:  5.6 cm    3.0 - 5.6 cm  LVIDs:  4.1 cm    1.8 - 4.0 cm  Derived Variables:  LVMI: 76 g/m2  RWT: 0.25  Fractional short: 27 %  Ejection Fraction (Teicholtz): 52 %  ------------------------------------------------------------------------  OBSERVATIONS:  Mitral Valve: Normal mitral valve. Minimal mitral  regurgitation.  Aortic Root: Normal aortic root.  Aortic Valve: Normal trileaflet aortic valve.  Left Atrium: Normal left atrium.  Left Ventricle: Endocardium not well visualized; grossly  mild global left ventricular systolic dysfunction. Normal  left ventricular internal dimensions and wall thicknesses.  Right Heart: Normal right atrium. The right ventricle is  not well visualized; grossly normal right ventricular  systolic function. Normal tricuspid valve.  Minimal  tricuspid regurgitation. Normal pulmonic valve. Minimal  pulmonic regurgitation.  Pericardium/PleuraNormal pericardium with no pericardial  effusion.  ------------------------------------------------------------------------  CONCLUSIONS:  1. Normal mitral valve. Minimal mitral regurgitation.  2. Normal left ventricular internal dimensions and wall  thicknesses.  3. Endocardium not well visualized; grossly mild global  left ventricular systolic dysfunction.  4. The right ventricle is not well visualized; grossly  normal right ventricular systolic function.        [ ]  Catheterization:  [ ] Stress Test:    OTHER:
Pain Service Follow-up  Postop Day  1    S/P  C- Section    T(C): 36.6 (11-07-17 @ 13:55), Max: 37.2 (11-06-17 @ 16:00)  HR: 86 (11-07-17 @ 13:55) (73 - 93)  BP: 107/59 (11-07-17 @ 13:55) (101/61 - 115/71)  RR: 18 (11-07-17 @ 13:55) (16 - 18)  SpO2: 99% (11-07-17 @ 13:55) (97% - 100%)  Wt(kg): --      THERAPY:  [X] Spinal morphine   [  ] Epidural morphine   [  ] IV PCA Hydromorphone 1 mg/ml    Sedation Score:	  [X] Alert	      [  ] Drowsy       [  ] Arousable	[  ] Asleep         [  ] Unresponsive    Side Effects:	  [X] None	      [  ] Nausea       [  ] Pruritus        [  ] Weakness   [  ] Numbness        ASSESSMENT/ PLAN   [ X ] Discontinue         [  ] Continue    [ X ] Documentation and Verification of current medications       Satisfactory Post Anesthetic Course
Patient is a 37y old  Female who presents with a chief complaint of Urgent  (2017 04:36)      SUBJECTIVE / OVERNIGHT EVENTS:    MEDICATIONS  (STANDING):  carvedilol 6.25 milliGRAM(s) Oral every 12 hours  labetalol 200 milliGRAM(s) Oral two times a day    MEDICATIONS  (PRN):  acetaminophen   Tablet. 650 milliGRAM(s) Oral every 6 hours PRN Mild Pain (1 - 3)  oxyCODONE    IR 5 milliGRAM(s) Oral every 4 hours PRN Moderate Pain (4 - 6)      CAPILLARY BLOOD GLUCOSE        I&O's Summary    2017 07:01  -  2017 07:00  --------------------------------------------------------  IN: 0 mL / OUT: 350 mL / NET: -350 mL    2017 07:01  -  2017 12:07  --------------------------------------------------------  IN: 120 mL / OUT: 175 mL / NET: -55 mL        PHYSICAL EXAM:  GENERAL: NAD, well-developed  HEAD:  Atraumatic, Normocephalic  EYES: EOMI, PERRLA, conjunctiva and sclera clear  NECK: Supple, No JVD  CHEST/LUNG: Clear to auscultation bilaterally; No wheeze  HEART: Regular rate and rhythm; No murmurs, rubs, or gallops  ABDOMEN: Soft, Nontender, Nondistended; Bowel sounds present  EXTREMITIES:  2+ Peripheral Pulses, No clubbing, cyanosis, or edema  PSYCH: AAOx3  NEUROLOGY: non-focal  SKIN: No rashes or lesions    LABS:                        10.3   9.66  )-----------( 200      ( 2017 08:05 )             30.1     Auto Eosinophil # 0.00  / Auto Eosinophil % 0.0   / Auto Neutrophil # 8.63  / Auto Neutrophil % 89.4  / BANDS % x                            10.6   6.28  )-----------( 212      ( 2017 01:30 )             31.4     Auto Eosinophil # 0.07  / Auto Eosinophil % 1.1   / Auto Neutrophil # 3.98  / Auto Neutrophil % 63.4  / BANDS % x            137  |  105  |  6<L>  ----------------------------<  137<H>  4.3   |  19<L>  |  0.45<L>    Ca    8.3<L>      2017 08:05  Mg     1.7       Phos  3.0       TPro  6.4  /  Alb  3.3  /  TBili  < 0.2<L>  /  DBili  x   /  AST  17  /  ALT  13  /  AlkPhos  62      PT/INR - ( 2017 01:53 )   PT: 10.5 SEC;   INR: 0.94          PTT - ( 2017 01:53 )  PTT:26.1 SEC      Urinalysis Basic - ( 2017 00:50 )    Color: PLYEL / Appearance: CLEAR / S.008 / pH: 6.5  Gluc: NEGATIVE / Ketone: NEGATIVE  / Bili: NEGATIVE / Urobili: NORMAL E.U.   Blood: NEGATIVE / Protein: NEGATIVE / Nitrite: NEGATIVE   Leuk Esterase: SMALL / RBC: 0-2 / WBC 2-5   Sq Epi: OCC / Non Sq Epi: x / Bacteria: FEW          RESPIRATORY  VENT:    ABG:     VBG:     RADIOLOGY & ADDITIONAL TESTS:    Imaging Personally Reviewed:    Consultant(s) Notes Reviewed:      Care Discussed with Consultants/Other Providers:
Patient seen and examined at bedside, no acute overnight events. No acute complaints, pain well controlled. Patient is ambulating, voiding spontaneously, tolerating regular diet. Reports bleeding is within normal limits. Denies any HA, vision changes, CP/SOB, F/C/S.    Vital Signs Last 24 Hours  T(C): 36.8 (11-08-17 @ 02:10), Max: 37.2 (11-07-17 @ 22:00)  HR: 75 (11-08-17 @ 02:10) (63 - 86)  BP: 106/63 (11-08-17 @ 02:10) (104/54 - 136/79)  RR: 18 (11-08-17 @ 02:10) (16 - 18)  SpO2: 96% (11-08-17 @ 02:10) (96% - 99%)    Physical Exam:  General: NAD  CV: RR  Lungs: CTAB  Abdomen: Soft, appropriately tender, non-distended, fundus firm  Incision: Pfannenstiel incision CDI, subcuticular suture closure  Pelvic: Lochia wnl  Ext: NTBL    Labs:    Blood Type: B Positive  Antibody Screen: Negative  RPR: Negative               9.8    11.11 )-----------( 220      ( 11-07 @ 05:30 )             29.3     11-07 @ 05:30    135  |  102  |  7   ----------------------------<  109  4.1   |  21  |  0.66    Ca    9.1      11-07 @ 05:30  Phos  3.6     11-07 @ 05:30  Mg     1.9     11-07 @ 05:30    TPro  6.4  /  Alb  3.3  /  TBili  < 0.2  /  DBili  x   /  AST  17  /  ALT  13  /  AlkPhos  62  11-06 @ 08:05    PT/INR - ( 11-06 @ 01:53 )   PT: 10.5 SEC;   INR: 0.94     PTT - ( 11-06 @ 01:53 )  PTT:26.1 SEC    Uric Acid: (11-06 @ 01:53)  --       Fibrinogen: (11-06 @ 01:53)  596.2    LDH: (11-06 @ 01:53)  --         MEDICATIONS  (STANDING):  acetaminophen   Tablet. 975 milliGRAM(s) Oral every 6 hours  carvedilol 6.25 milliGRAM(s) Oral every 12 hours  diphtheria/tetanus/pertussis (acellular) Vaccine (ADAcel) 0.5 milliLiter(s) IntraMuscular once  enoxaparin Injectable 40 milliGRAM(s) SubCutaneous daily  ferrous    sulfate 325 milliGRAM(s) Oral daily  ibuprofen  Tablet 600 milliGRAM(s) Oral every 6 hours  labetalol 200 milliGRAM(s) Oral two times a day  lactated ringers. 1000 milliLiter(s) (75 mL/Hr) IV Continuous <Continuous>  oxyCODONE    IR 5 milliGRAM(s) Oral every 3 hours  oxyCODONE    IR 5 milliGRAM(s) Oral every 3 hours  oxytocin Infusion 41.667 milliUNIT(s)/Min (125 mL/Hr) IV Continuous <Continuous>  prenatal multivitamin 1 Tablet(s) Oral daily    MEDICATIONS  (PRN):  acetaminophen   Tablet. 650 milliGRAM(s) Oral every 6 hours PRN Mild Pain (1 - 3)  diphenhydrAMINE   Capsule 25 milliGRAM(s) Oral every 6 hours PRN Itching  docusate sodium 100 milliGRAM(s) Oral two times a day PRN Stool Softening  glycerin Suppository - Adult 1 Suppository(s) Rectal at bedtime PRN Constipation  lanolin Ointment 1 Application(s) Topical every 3 hours PRN Sore Nipples  oxyCODONE    IR 5 milliGRAM(s) Oral every 4 hours PRN Moderate Pain (4 - 6)  oxyCODONE    IR 5 milliGRAM(s) Oral every 4 hours PRN Severe Pain (7 - 10)  simethicone 80 milliGRAM(s) Chew every 4 hours PRN Gas
Patient seen and examined at bedside, no acute overnight events. No acute complaints, pain well controlled. Patient tolerating regular diet. Has not yet passed flatus. Brown is still in place. Denies any HA/vision changes, CP/SOB, F/C/S, N/V.    Vital Signs Last 24 Hours  T(C): 37.1 (11-07-17 @ 07:01), Max: 37.2 (11-06-17 @ 16:00)  HR: 78 (11-07-17 @ 07:01) (66 - 93)  BP: 105/64 (11-07-17 @ 07:01) (101/61 - 115/71)  RR: 18 (11-07-17 @ 07:01) (15 - 25)  SpO2: 97% (11-07-17 @ 07:01) (97% - 100%)    I&O's Summary    06 Nov 2017 07:01  -  07 Nov 2017 07:00  --------------------------------------------------------  IN: 570 mL / OUT: 1225 mL / NET: -655 mL    Physical Exam:  General: NAD  Abdomen: Soft, appropriately tender, non-distended, no RUQ/epigastric tenderness, fundus firm  Incision: Pfannenstiel incision CDI, subcuticular suture closure  Pelvic: Lochia wnl  Ext: NTBL    Labs:    Blood Type: B Positive  Antibody Screen: Negative  RPR: Negative                 9.8    11.11 )-----------( 220      ( 11-07 @ 05:30 )             29.3     11-07 @ 05:30    135  |  102  |  7   ----------------------------<  109  4.1   |  21  |  0.66    Ca    9.1      11-07 @ 05:30  Phos  3.6     11-07 @ 05:30  Mg     1.9     11-07 @ 05:30    TPro  6.4  /  Alb  3.3  /  TBili  < 0.2  /  DBili  x   /  AST  17  /  ALT  13  /  AlkPhos  62  11-06 @ 08:05    PT/INR - ( 11-06 @ 01:53 )   PT: 10.5 SEC;   INR: 0.94     PTT - ( 11-06 @ 01:53 )  PTT:26.1 SEC    Uric Acid: (11-06 @ 01:53)  --       Fibrinogen: (11-06 @ 01:53)  596.2    LDH: (11-06 @ 01:53)  --         MEDICATIONS  (STANDING):  acetaminophen   Tablet. 975 milliGRAM(s) Oral every 6 hours  carvedilol 6.25 milliGRAM(s) Oral every 12 hours  diphtheria/tetanus/pertussis (acellular) Vaccine (ADAcel) 0.5 milliLiter(s) IntraMuscular once  enoxaparin Injectable 40 milliGRAM(s) SubCutaneous daily  ferrous    sulfate 325 milliGRAM(s) Oral daily  ibuprofen  Tablet 600 milliGRAM(s) Oral every 6 hours  ketorolac   Injectable 30 milliGRAM(s) IV Push every 6 hours  labetalol 200 milliGRAM(s) Oral two times a day  lactated ringers. 1000 milliLiter(s) (75 mL/Hr) IV Continuous <Continuous>  oxyCODONE    IR 5 milliGRAM(s) Oral every 3 hours  oxyCODONE    IR 5 milliGRAM(s) Oral every 3 hours  oxytocin Infusion 41.667 milliUNIT(s)/Min (125 mL/Hr) IV Continuous <Continuous>  prenatal multivitamin 1 Tablet(s) Oral daily    MEDICATIONS  (PRN):  acetaminophen   Tablet. 650 milliGRAM(s) Oral every 6 hours PRN Mild Pain (1 - 3)  diphenhydrAMINE   Capsule 25 milliGRAM(s) Oral every 6 hours PRN Itching  docusate sodium 100 milliGRAM(s) Oral two times a day PRN Stool Softening  glycerin Suppository - Adult 1 Suppository(s) Rectal at bedtime PRN Constipation  lanolin Ointment 1 Application(s) Topical every 3 hours PRN Sore Nipples  oxyCODONE    IR 5 milliGRAM(s) Oral every 4 hours PRN Moderate Pain (4 - 6)  oxyCODONE    IR 5 milliGRAM(s) Oral every 4 hours PRN Severe Pain (7 - 10)  simethicone 80 milliGRAM(s) Chew every 4 hours PRN Gas
Patient seen and examined at bedside, no acute overnight events. States area above incision is slightly warm to the touch. Pain well controlled. Patient is ambulating, voiding spontaneously, passing flatus, and tolerating regular diet. Reports bleeding is within normal limits. Denies any HA, vision changes, CP/SOB, F/C/S.    Vital Signs Last 24 Hours  T(C): 36.9 (11-09-17 @ 06:00), Max: 36.9 (11-09-17 @ 06:00)  HR: 79 (11-09-17 @ 06:00) (78 - 85)  BP: 129/79 (11-09-17 @ 06:00) (110/59 - 129/79)  RR: 17 (11-09-17 @ 06:00) (17 - 19)  SpO2: 100% (11-09-17 @ 06:00) (99% - 100%)    Physical Exam:  General: NAD  Abdomen: Soft, appropriately tender, non-distended, fundus firm  Incision: Pfannenstiel incision CDI, subcuticular suture closure, area above incision is slightly warm but no erythema or swelling  Ext: NTBL    Labs:    Blood Type: B Positive  Antibody Screen: Negative  RPR: Negative      MEDICATIONS  (STANDING):  acetaminophen   Tablet. 975 milliGRAM(s) Oral every 6 hours  carvedilol 6.25 milliGRAM(s) Oral every 12 hours  enoxaparin Injectable 40 milliGRAM(s) SubCutaneous daily  ferrous    sulfate 325 milliGRAM(s) Oral daily  ibuprofen  Tablet 600 milliGRAM(s) Oral every 6 hours  labetalol 200 milliGRAM(s) Oral two times a day  lactated ringers. 1000 milliLiter(s) (75 mL/Hr) IV Continuous <Continuous>  oxyCODONE    IR 5 milliGRAM(s) Oral every 3 hours  oxyCODONE    IR 5 milliGRAM(s) Oral every 3 hours  oxytocin Infusion 41.667 milliUNIT(s)/Min (125 mL/Hr) IV Continuous <Continuous>  prenatal multivitamin 1 Tablet(s) Oral daily    MEDICATIONS  (PRN):  acetaminophen   Tablet. 650 milliGRAM(s) Oral every 6 hours PRN Mild Pain (1 - 3)  diphenhydrAMINE   Capsule 25 milliGRAM(s) Oral every 6 hours PRN Itching  docusate sodium 100 milliGRAM(s) Oral two times a day PRN Stool Softening  glycerin Suppository - Adult 1 Suppository(s) Rectal at bedtime PRN Constipation  lanolin Ointment 1 Application(s) Topical every 3 hours PRN Sore Nipples  oxyCODONE    IR 5 milliGRAM(s) Oral every 4 hours PRN Moderate Pain (4 - 6)  oxyCODONE    IR 5 milliGRAM(s) Oral every 4 hours PRN Severe Pain (7 - 10)  simethicone 80 milliGRAM(s) Chew every 4 hours PRN Gas
Patient seen and examined at bedside. No acute complaints, pain well controlled. Patient tolerating regular diet. Has not yet passed flatus. Brown is still in place. Denies any HA/vision changes, CP/SOB, F/C/S, N/V.    Vital Signs Last 24 Hours  T(C): 35.6 (11-06-17 @ 05:00), Max: 36.7 (11-05-17 @ 22:35)  HR: 75 (11-06-17 @ 07:00) (64 - 88)  BP: 109/62 (11-06-17 @ 04:30) (109/62 - 112/72)  RR: 21 (11-06-17 @ 07:00) (16 - 22)  SpO2: 97% (11-06-17 @ 07:00) (97% - 100%)    I&O's Summary    05 Nov 2017 07:01  -  06 Nov 2017 07:00  --------------------------------------------------------  IN: 0 mL / OUT: 350 mL / NET: -350 mL      Physical Exam:  General: NAD  CV: RR  Lungs: CTAB  Abdomen: Soft, appropriately tender, non-distended, no RUQ/epigastric tenderness, fundus firm  Incision: Pfannenstiel incision CDI  Pelvic: Lochia wnl  Ext: NTBL    Labs:    Blood Type: B Positive  Antibody Screen: Negative                 10.6   6.28  )-----------( 212      ( 11-06 @ 01:30 )             31.4           PT/INR - ( 11-06 @ 01:53 )   PT: 10.5 SEC;   INR: 0.94     PTT - ( 11-06 @ 01:53 )  PTT:26.1 SEC    Uric Acid: (11-06 @ 01:53)  --       Fibrinogen: (11-06 @ 01:53)  596.2    LDH: (11-06 @ 01:53)  --         MEDICATIONS  (STANDING):  carvedilol 6.25 milliGRAM(s) Oral every 12 hours  labetalol 200 milliGRAM(s) Oral two times a day    MEDICATIONS  (PRN):  acetaminophen   Tablet. 650 milliGRAM(s) Oral every 6 hours PRN Mild Pain (1 - 3)  oxyCODONE    IR 5 milliGRAM(s) Oral every 4 hours PRN Moderate Pain (4 - 6)
Subjective: Patient seen and examined. No new events except as noted.     REVIEW OF SYSTEMS:    CONSTITUTIONAL: No weakness, fevers or chills  EYES/ENT: No visual changes;  No vertigo or throat pain   NECK: No pain or stiffness  RESPIRATORY: No cough, wheezing, hemoptysis; No shortness of breath  CARDIOVASCULAR: No chest pain or palpitations  GASTROINTESTINAL: No abdominal or epigastric pain. No nausea, vomiting, or hematemesis; No diarrhea or constipation. No melena or hematochezia.  GENITOURINARY: No dysuria, frequency or hematuria  NEUROLOGICAL: No numbness or weakness  SKIN: No itching, burning, rashes, or lesions   All other review of systems is negative unless indicated above.    MEDICATIONS:  MEDICATIONS  (STANDING):  acetaminophen   Tablet. 975 milliGRAM(s) Oral every 6 hours  carvedilol 6.25 milliGRAM(s) Oral every 12 hours  enoxaparin Injectable 40 milliGRAM(s) SubCutaneous daily  ferrous    sulfate 325 milliGRAM(s) Oral daily  ibuprofen  Tablet 600 milliGRAM(s) Oral every 6 hours  labetalol 200 milliGRAM(s) Oral two times a day  lactated ringers. 1000 milliLiter(s) (75 mL/Hr) IV Continuous <Continuous>  oxyCODONE    IR 5 milliGRAM(s) Oral every 3 hours  oxyCODONE    IR 5 milliGRAM(s) Oral every 3 hours  oxytocin Infusion 41.667 milliUNIT(s)/Min (125 mL/Hr) IV Continuous <Continuous>  prenatal multivitamin 1 Tablet(s) Oral daily      PHYSICAL EXAM:  T(C): 36.6 (11-09-17 @ 09:56), Max: 36.9 (11-09-17 @ 06:00)  HR: 71 (11-09-17 @ 10:00) (71 - 85)  BP: 118/73 (11-09-17 @ 10:00) (110/59 - 129/79)  RR: 18 (11-09-17 @ 09:56) (17 - 18)  SpO2: 100% (11-09-17 @ 10:00) (100% - 100%)  Wt(kg): --  I&O's Summary        Appearance: Normal	  HEENT:   Normal oral mucosa, PERRL, EOMI	  Lymphatic: No lymphadenopathy , no edema  Cardiovascular: Normal S1 S2, No JVD, No murmurs , Peripheral pulses palpable 2+ bilaterally  Respiratory: Lungs clear to auscultation, normal effort 	  Gastrointestinal:  Soft, Non-tender, + BS	  Skin: No rashes, No ecchymoses, No cyanosis, warm to touch  Musculoskeletal: Normal range of motion, normal strength  Psychiatry:  Mood & affect appropriate  Ext: No edema      LABS:    CARDIAC MARKERS:                  proBNP:   Lipid Profile:   HgA1c:   TSH:             TELEMETRY: 	    ECG:  	  RADIOLOGY:   DIAGNOSTIC TESTING:  [ ] Echocardiogram:  [ ]  Catheterization:  [ ] Stress Test:    OTHER:
Subjective: Patient seen and examined. No new events except as noted.   feels well  no complaints       REVIEW OF SYSTEMS:    CONSTITUTIONAL: No weakness, fevers or chills  EYES/ENT: No visual changes;  No vertigo or throat pain   NECK: No pain or stiffness  RESPIRATORY: No cough, wheezing, hemoptysis; No shortness of breath  CARDIOVASCULAR: No chest pain or palpitations  GASTROINTESTINAL: No abdominal or epigastric pain. No nausea, vomiting, or hematemesis; No diarrhea or constipation. No melena or hematochezia.  GENITOURINARY: No dysuria, frequency or hematuria  NEUROLOGICAL: No numbness or weakness  SKIN: No itching, burning, rashes, or lesions   All other review of systems is negative unless indicated above.    MEDICATIONS:  MEDICATIONS  (STANDING):  acetaminophen   Tablet. 975 milliGRAM(s) Oral every 6 hours  carvedilol 6.25 milliGRAM(s) Oral every 12 hours  diphtheria/tetanus/pertussis (acellular) Vaccine (ADAcel) 0.5 milliLiter(s) IntraMuscular once  enoxaparin Injectable 40 milliGRAM(s) SubCutaneous daily  ferrous    sulfate 325 milliGRAM(s) Oral daily  ibuprofen  Tablet 600 milliGRAM(s) Oral every 6 hours  labetalol 200 milliGRAM(s) Oral two times a day  lactated ringers. 1000 milliLiter(s) (75 mL/Hr) IV Continuous <Continuous>  oxyCODONE    IR 5 milliGRAM(s) Oral every 3 hours  oxyCODONE    IR 5 milliGRAM(s) Oral every 3 hours  oxytocin Infusion 41.667 milliUNIT(s)/Min (125 mL/Hr) IV Continuous <Continuous>  prenatal multivitamin 1 Tablet(s) Oral daily      PHYSICAL EXAM:  T(C): 36.8 (11-08-17 @ 02:10), Max: 37.2 (11-07-17 @ 22:00)  HR: 75 (11-08-17 @ 02:10) (63 - 86)  BP: 106/63 (11-08-17 @ 02:10) (106/63 - 136/79)  RR: 18 (11-08-17 @ 02:10) (16 - 18)  SpO2: 96% (11-08-17 @ 02:10) (96% - 99%)  Wt(kg): --  I&O's Summary    07 Nov 2017 07:01  -  08 Nov 2017 07:00  --------------------------------------------------------  IN: 0 mL / OUT: 1000 mL / NET: -1000 mL          Appearance: Normal	  HEENT:   Normal oral mucosa, PERRL, EOMI	  Lymphatic: No lymphadenopathy , no edema  Cardiovascular: Normal S1 S2, No JVD, No murmurs , Peripheral pulses palpable 2+ bilaterally  Respiratory: Lungs clear to auscultation, normal effort 	  Gastrointestinal:  Soft, Non-tender, + BS	  Skin: No rashes, No ecchymoses, No cyanosis, warm to touch  Musculoskeletal: Normal range of motion, normal strength  Psychiatry:  Mood & affect appropriate  Ext: No edema      LABS:    CARDIAC MARKERS:                                9.8    11.11 )-----------( 220      ( 07 Nov 2017 05:30 )             29.3     11-07    135  |  102  |  7   ----------------------------<  109<H>  4.1   |  21<L>  |  0.66    Ca    9.1      07 Nov 2017 05:30  Phos  3.6     11-07  Mg     1.9     11-07      proBNP:   Lipid Profile:   HgA1c:   TSH:     NEGATIVE          TELEMETRY: 	    ECG:  	  RADIOLOGY:   DIAGNOSTIC TESTING:  [ ] Echocardiogram:  [ ]  Catheterization:  [ ] Stress Test:    OTHER:

## 2017-11-09 NOTE — PROGRESS NOTE ADULT - PROBLEM SELECTOR PROBLEM 3
Placenta abruptio, antepartum, unspecified trimester

## 2017-11-09 NOTE — DISCHARGE NOTE OB - MEDICATION SUMMARY - MEDICATIONS TO TAKE
I will START or STAY ON the medications listed below when I get home from the hospital:    acetaminophen 325 mg oral tablet  -- 3 tab(s) by mouth every 6 hours  -- Indication: For Pain    acetaminophen 325 mg oral tablet  -- 2 tab(s) by mouth every 6 hours, As needed, Mild Pain (1 - 3)  -- Indication: For Pain    ibuprofen 600 mg oral tablet  -- 1 tab(s) by mouth every 6 hours  -- Indication: For Pain    oxyCODONE 5 mg oral tablet  -- 1 tab(s) by mouth every 6 hours, As Needed -for severe pain MDD:4   -- Caution federal law prohibits the transfer of this drug to any person other  than the person for whom it was prescribed.  It is very important that you take or use this exactly as directed.  Do not skip doses or discontinue unless directed by your doctor.  May cause drowsiness.  Alcohol may intensify this effect.  Use care when operating dangerous machinery.  This prescription cannot be refilled.  Using more of this medication than prescribed may cause serious breathing problems.    -- Indication: For Pain    enoxaparin 40 mg/0.4 mL injectable solution  -- 40 milligram(s) injectable once a day   -- It is very important that you take or use this exactly as directed.  Do not skip doses or discontinue unless directed by your doctor.    -- Indication: For anti-clot    labetalol 200 mg oral tablet  -- 1 tab(s) by mouth 2 times a day  -- Indication: For Heart    carvedilol 6.25 mg oral tablet  -- 1 tab(s) by mouth every 12 hours  -- Indication: For Heart

## 2018-01-12 NOTE — PATIENT PROFILE OB - FUNCTIONAL SCREEN CURRENT LEVEL: TRANSFERRING, MLM
I suggest monitoring the sodium as SIADH from Lexapro  use and hypersecretion of ADH associated with surgery can reduce sodium in the perioperative period   (0) independent

## 2018-07-27 PROBLEM — Z78.9 ALCOHOL USE: Status: ACTIVE | Noted: 2017-01-05

## 2018-11-16 ENCOUNTER — EMERGENCY (EMERGENCY)
Facility: HOSPITAL | Age: 38
LOS: 1 days | Discharge: HOME | End: 2018-11-16
Attending: EMERGENCY MEDICINE | Admitting: EMERGENCY MEDICINE
Payer: COMMERCIAL

## 2018-11-16 VITALS
RESPIRATION RATE: 18 BRPM | HEART RATE: 78 BPM | SYSTOLIC BLOOD PRESSURE: 119 MMHG | OXYGEN SATURATION: 100 % | DIASTOLIC BLOOD PRESSURE: 65 MMHG | TEMPERATURE: 99 F

## 2018-11-16 VITALS
DIASTOLIC BLOOD PRESSURE: 85 MMHG | OXYGEN SATURATION: 100 % | HEART RATE: 80 BPM | TEMPERATURE: 99 F | RESPIRATION RATE: 18 BRPM | SYSTOLIC BLOOD PRESSURE: 141 MMHG

## 2018-11-16 LAB
ALBUMIN SERPL ELPH-MCNC: 4 G/DL — SIGNIFICANT CHANGE UP (ref 3.3–5)
ALP SERPL-CCNC: 55 U/L — SIGNIFICANT CHANGE UP (ref 40–120)
ALT FLD-CCNC: 13 U/L — SIGNIFICANT CHANGE UP (ref 4–33)
APPEARANCE UR: CLEAR — SIGNIFICANT CHANGE UP
AST SERPL-CCNC: 14 U/L — SIGNIFICANT CHANGE UP (ref 4–32)
BILIRUB SERPL-MCNC: < 0.2 MG/DL — LOW (ref 0.2–1.2)
BILIRUB UR-MCNC: NEGATIVE — SIGNIFICANT CHANGE UP
BLD GP AB SCN SERPL QL: NEGATIVE — SIGNIFICANT CHANGE UP
BLOOD UR QL VISUAL: NEGATIVE — SIGNIFICANT CHANGE UP
BUN SERPL-MCNC: 9 MG/DL — SIGNIFICANT CHANGE UP (ref 7–23)
CALCIUM SERPL-MCNC: 9.2 MG/DL — SIGNIFICANT CHANGE UP (ref 8.4–10.5)
CHLORIDE SERPL-SCNC: 104 MMOL/L — SIGNIFICANT CHANGE UP (ref 98–107)
CO2 SERPL-SCNC: 21 MMOL/L — LOW (ref 22–31)
COLOR SPEC: YELLOW — SIGNIFICANT CHANGE UP
CREAT SERPL-MCNC: 0.6 MG/DL — SIGNIFICANT CHANGE UP (ref 0.5–1.3)
GLUCOSE SERPL-MCNC: 90 MG/DL — SIGNIFICANT CHANGE UP (ref 70–99)
GLUCOSE UR-MCNC: NEGATIVE — SIGNIFICANT CHANGE UP
HCG SERPL-ACNC: SIGNIFICANT CHANGE UP MIU/ML
HCT VFR BLD CALC: 32.6 % — LOW (ref 34.5–45)
HGB BLD-MCNC: 11.1 G/DL — LOW (ref 11.5–15.5)
KETONES UR-MCNC: NEGATIVE — SIGNIFICANT CHANGE UP
LEUKOCYTE ESTERASE UR-ACNC: NEGATIVE — SIGNIFICANT CHANGE UP
MCHC RBC-ENTMCNC: 30.8 PG — SIGNIFICANT CHANGE UP (ref 27–34)
MCHC RBC-ENTMCNC: 34 % — SIGNIFICANT CHANGE UP (ref 32–36)
MCV RBC AUTO: 90.6 FL — SIGNIFICANT CHANGE UP (ref 80–100)
NITRITE UR-MCNC: NEGATIVE — SIGNIFICANT CHANGE UP
NRBC # FLD: 0 — SIGNIFICANT CHANGE UP
PH UR: 6 — SIGNIFICANT CHANGE UP (ref 5–8)
PLATELET # BLD AUTO: 257 K/UL — SIGNIFICANT CHANGE UP (ref 150–400)
PMV BLD: 10.5 FL — SIGNIFICANT CHANGE UP (ref 7–13)
POTASSIUM SERPL-MCNC: 3.9 MMOL/L — SIGNIFICANT CHANGE UP (ref 3.5–5.3)
POTASSIUM SERPL-SCNC: 3.9 MMOL/L — SIGNIFICANT CHANGE UP (ref 3.5–5.3)
PROT SERPL-MCNC: 7.1 G/DL — SIGNIFICANT CHANGE UP (ref 6–8.3)
PROT UR-MCNC: 10 — SIGNIFICANT CHANGE UP
RBC # BLD: 3.6 M/UL — LOW (ref 3.8–5.2)
RBC # FLD: 12.8 % — SIGNIFICANT CHANGE UP (ref 10.3–14.5)
RH IG SCN BLD-IMP: POSITIVE — SIGNIFICANT CHANGE UP
SODIUM SERPL-SCNC: 135 MMOL/L — SIGNIFICANT CHANGE UP (ref 135–145)
SP GR SPEC: 1.03 — SIGNIFICANT CHANGE UP (ref 1–1.04)
UROBILINOGEN FLD QL: SIGNIFICANT CHANGE UP
WBC # BLD: 5.29 K/UL — SIGNIFICANT CHANGE UP (ref 3.8–10.5)
WBC # FLD AUTO: 5.29 K/UL — SIGNIFICANT CHANGE UP (ref 3.8–10.5)

## 2018-11-16 PROCEDURE — 99285 EMERGENCY DEPT VISIT HI MDM: CPT

## 2018-11-16 PROCEDURE — 76830 TRANSVAGINAL US NON-OB: CPT | Mod: 26

## 2018-11-16 NOTE — ED PROVIDER NOTE - OBJECTIVE STATEMENT
38 year old  8 weeks pregnant with 1 day vaginal spotting. no pain. has confirmed iup on lovenox for dvt during previous pregnancy.

## 2018-11-16 NOTE — ED PROVIDER NOTE - MEDICAL DECISION MAKING DETAILS
vag bleeding during 1st trimester. likely threatened ab. bleeding decreased. check labs us and follow up with gyn

## 2018-11-16 NOTE — ED PROVIDER NOTE - PMH
Cardiomyopathy    Cervical dysplasia  treated with cyro-therapy- 06/2013  Heart failure    HTN (hypertension)    Hypertrophy of breast    Portal vein thrombosis    Pre-eclampsia

## 2018-11-16 NOTE — ED ADULT TRIAGE NOTE - CHIEF COMPLAINT QUOTE
reports 8 wks pregnant reports vaginal spotting that began today. denies clots or saturating pads. denies pain or cramping.

## 2018-11-17 PROBLEM — I81 PORTAL VEIN THROMBOSIS: Chronic | Status: ACTIVE | Noted: 2017-11-06

## 2018-11-17 PROBLEM — O14.90 UNSPECIFIED PRE-ECLAMPSIA, UNSPECIFIED TRIMESTER: Chronic | Status: ACTIVE | Noted: 2017-11-06

## 2018-11-17 PROBLEM — I42.9 CARDIOMYOPATHY, UNSPECIFIED: Chronic | Status: ACTIVE | Noted: 2017-11-06

## 2018-11-17 PROBLEM — I50.9 HEART FAILURE, UNSPECIFIED: Chronic | Status: ACTIVE | Noted: 2017-11-06

## 2019-04-08 NOTE — PROGRESS NOTE ADULT - PROBLEM SELECTOR PLAN 1
stable   orders per Gyn team
- Appreciate CCU care  - Echo today
- Reg diet  - PO meds for pain control  - D/C streeter  - f/u AM CBC  - transition to Lovenox for history of portal vein thrombosis
- Reg diet  - PO meds for pain control  - ambulate  - voiding
- Reg diet  - PO pain meds  - Ambulating  - Voiding  - Lovenox for DVT ppx -> discharge with home for 6 wks
stable   orders per Gyn team
stable   orders per Gyn team
Sweetie, Felicia

## 2023-08-21 NOTE — DISCHARGE NOTE OB - NS OB DC IMMUNIZATIONS2 YN
Medical Necessity Information: It is in your best interest to select a reason for this procedure from the list below. All of these items fulfill various CMS LCD requirements except the new and changing color options. Medical Necessity Clause: This procedure was medically necessary because the lesion that was treated was: Lab: -[id number] Lab Facility: 0 Detail Level: Detailed Was A Bandage Applied: Yes Size Of Lesion In Cm (Required): 0.6 Depth Of Shave: dermis Biopsy Method: Dermablade Anesthesia Type: 0.5% lidocaine with 1:200,000 epinephrine and a 1:10 solution of 8.4% sodium bicarbonate Hemostasis: Aluminum Chloride and Electrocautery Wound Care: Petrolatum Render Path Notes In Note?: No Consent was obtained from the patient. The risks and benefits to therapy were discussed in detail. Specifically, the risks of infection, scarring, bleeding, prolonged wound healing, incomplete removal, allergy to anesthesia, nerve injury and recurrence were addressed. Prior to the procedure, the treatment site was clearly identified and confirmed by the patient. All components of Universal Protocol/PAUSE Rule completed. Post-Care Instructions: I reviewed with the patient in detail post-care instructions. Patient is to keep the biopsy site dry overnight, and then apply bacitracin twice daily until healed. Patient may apply hydrogen peroxide soaks to remove any crusting. Notification Instructions: Patient will be notified of pathology results. However, patient instructed to call the office if not contacted within 2 weeks. Billing Type: Third-Party Bill Yes

## 2024-08-17 ENCOUNTER — EMERGENCY (EMERGENCY)
Facility: HOSPITAL | Age: 44
LOS: 1 days | Discharge: HOME | End: 2024-08-17
Attending: STUDENT IN AN ORGANIZED HEALTH CARE EDUCATION/TRAINING PROGRAM | Admitting: STUDENT IN AN ORGANIZED HEALTH CARE EDUCATION/TRAINING PROGRAM

## 2024-08-17 VITALS
DIASTOLIC BLOOD PRESSURE: 85 MMHG | SYSTOLIC BLOOD PRESSURE: 138 MMHG | WEIGHT: 169.98 LBS | HEART RATE: 93 BPM | RESPIRATION RATE: 15 BRPM | TEMPERATURE: 98 F | OXYGEN SATURATION: 98 %

## 2024-08-17 VITALS
TEMPERATURE: 98 F | DIASTOLIC BLOOD PRESSURE: 77 MMHG | OXYGEN SATURATION: 99 % | RESPIRATION RATE: 19 BRPM | HEART RATE: 88 BPM | SYSTOLIC BLOOD PRESSURE: 116 MMHG

## 2024-08-17 PROCEDURE — 99284 EMERGENCY DEPT VISIT MOD MDM: CPT

## 2024-08-17 PROCEDURE — 71046 X-RAY EXAM CHEST 2 VIEWS: CPT | Mod: 26

## 2024-08-17 RX ORDER — IBUPROFEN 200 MG
1 TABLET ORAL
Qty: 10 | Refills: 0
Start: 2024-08-17

## 2024-08-17 RX ORDER — LIDOCAINE 5% 5 G/100G
1 CREAM TOPICAL ONCE
Refills: 0 | Status: COMPLETED | OUTPATIENT
Start: 2024-08-17 | End: 2024-08-17

## 2024-08-17 RX ORDER — CYCLOBENZAPRINE HCL 10 MG
1 TABLET ORAL
Qty: 10 | Refills: 0
Start: 2024-08-17

## 2024-08-17 RX ORDER — KETOROLAC TROMETHAMINE 10 MG
30 TABLET ORAL ONCE
Refills: 0 | Status: DISCONTINUED | OUTPATIENT
Start: 2024-08-17 | End: 2024-08-17

## 2024-08-17 RX ORDER — CYCLOBENZAPRINE HCL 10 MG
5 TABLET ORAL ONCE
Refills: 0 | Status: COMPLETED | OUTPATIENT
Start: 2024-08-17 | End: 2024-08-17

## 2024-08-17 RX ADMIN — Medication 5 MILLIGRAM(S): at 22:12

## 2024-08-17 RX ADMIN — LIDOCAINE 5% 1 PATCH: 5 CREAM TOPICAL at 22:13

## 2024-08-17 RX ADMIN — Medication 30 MILLIGRAM(S): at 22:12

## 2024-08-17 NOTE — ED PROVIDER NOTE - PROGRESS NOTE DETAILS
SHIRLEY Stephenson: pt states pain improved with medication.  Awaiting xray results. SHIRLEY Stephenson: x-ray prelim report showing distended air filled esophagus, pt reports history of achalasia with repair.  Pt denies abdominal pain, nausea, vomiting.  Pt reports improvement in pain.  Pt offered and declined labs and CT shared decision making denies any GI symptoms will follow up with her PMD and GI return precautions discussed.

## 2024-08-17 NOTE — ED PROVIDER NOTE - NSICDXFAMILYHX_GEN_ALL_CORE_FT
FAMILY HISTORY:  Family history of hypertension in father  Family history of hypertension in mother    Mother  Still living? Unknown  Family history of diabetes mellitus in mother, Age at diagnosis: Age Unknown

## 2024-08-17 NOTE — ED PROVIDER NOTE - NSICDXPASTMEDICALHX_GEN_ALL_CORE_FT
PAST MEDICAL HISTORY:  Cardiomyopathy     Cervical dysplasia treated with cyro-therapy- 06/2013    Heart failure     HTN (hypertension)     Hypertrophy of breast     Portal vein thrombosis     Pre-eclampsia

## 2024-08-17 NOTE — ED ADULT NURSE NOTE - NSFALLUNIVINTERV_ED_ALL_ED
I WILL ACCOMPANY Patient to inpatient unit and report to primary nurse once arrived to floor, meds given/plan of care/diagnoses/ fall risk. Ending dx's include hiatal hernia/proximal esophageal ulcer, proximal esophageal stenosis, bougie dilitation x 3 bougie stylets, plan is soft diet and consult gyn.  Md aware of elevated heart rate at transfer from endoscopy, no further orders for same, he advises patient has been tachycardic frequently since admission Bed/Stretcher in lowest position, wheels locked, appropriate side rails in place/Call bell, personal items and telephone in reach/Instruct patient to call for assistance before getting out of bed/chair/stretcher/Non-slip footwear applied when patient is off stretcher/Interior to call system/Physically safe environment - no spills, clutter or unnecessary equipment/Purposeful proactive rounding/Room/bathroom lighting operational, light cord in reach

## 2024-08-17 NOTE — ED ADULT TRIAGE NOTE - CHIEF COMPLAINT QUOTE
pt ambulatory, c/o back pain. denies LE numbness/tingling, urinary complaints, fall/trauma. past medical history: HTN

## 2024-08-17 NOTE — ED PROVIDER NOTE - CLINICAL SUMMARY MEDICAL DECISION MAKING FREE TEXT BOX
44-year-old female with a history of hypertension presents to the emergency department complaining of left-sided mid back pain x 1 day.  Patient states pain is worse with movement.  Pt is well appearing, NAD, exam consistent with MSK pain, pain for pain control, cxr given location of pain, reassess.

## 2024-08-17 NOTE — ED PROVIDER NOTE - OBJECTIVE STATEMENT
44-year-old female with a history of hypertension presents to the emergency department complaining of left-sided mid back pain x 1 day.  Patient states pain is worse with movement.  Patient denies injury, numbness, tingling, chest pain, shortness of breath, fevers, chills.

## 2024-08-17 NOTE — ED PROVIDER NOTE - PATIENT PORTAL LINK FT
You can access the FollowMyHealth Patient Portal offered by [facility] by registering at the following website: [email or web address]. By joining [employer]’s FollowMyHealth portal, you will also be able to view your health information using other applications (apps) compatible with our system.

## 2024-08-17 NOTE — ED PROVIDER NOTE - NSFOLLOWUPINSTRUCTIONS_ED_ALL_ED_FT
Follow up with your Doctor in 1-2 days.    Heat to back.    Take Ibuprofen 600mg orally every 8 hours as needed for pain take with food.    Flexeril 5mg orally every 8 hours as needed for muscle spasm don't drive or drink alcohol while taking this medications.   Return to the ER for any persistent/worsening or new symptoms, weakness, numbness, difficulty urinating or any concerning symptoms.

## 2024-08-17 NOTE — ED ADULT NURSE NOTE - OBJECTIVE STATEMENT
Patient received wellness, a&ox4, ambulatory. Pt c/o atraumatic lower back pain starting today. pt denies heavy lifting, injury, chest pain, sob, numbness, tingling. Breathing even, unlabored. Pending SHIRLEY diana.

## 2025-05-21 NOTE — DISCHARGE NOTE ADULT - DISCHARGE WEIGHT
History Of Present Illness  Francheska Ochoa is a 80 y.o. female presenting with atrial fibrillation, here for RFA. PMH includes HTN, HLD, DM2, CKD3, and new onset paroxysmal atrial fibrillation (12/2024) on xarelto and amiodarone.    Past Medical History:  Medical History[1]     Past Surgical History:  Surgical History[2]       Social History:  Social History[3]    Family History:  Family History[4]     Allergies:  RX Allergies[5]     Home Medications:  Current Outpatient Medications   Medication Instructions    amiodarone (Pacerone) 200 mg tablet Take half a tablet daily.    amLODIPine (NORVASC) 10 mg, oral, Daily    Autolet lancing device 1 each, miscellaneous, 2 times daily, CHECK BLOOD SUGAR TWICE DAILY    blood sugar diagnostic (Accu-Chek Guide test strips) strip 1 strip, miscellaneous, 2 times daily, CHECK BLOOD SUGAR TWICE DAILY    cholecalciferol (VITAMIN D-3) 50 mcg, oral, Daily    citalopram (CELEXA) 20 mg, oral, Every Day    cranberry 400 mg capsule 1 tablet, Daily    diclofenac sodium (Voltaren) 1 % gel APPLY TO THE AFFECTED AREAS 2 GRAMS (2.25 INCHES) TOPICALLY TWICE DAILY AS NEEDED    fenofibrate (TRICOR) 145 mg, oral, Daily    ferrous gluconate (FERGON) 324 mg, oral, 2 times daily    metoprolol tartrate (LOPRESSOR) 50 mg, oral, 2 times daily    olmesartan (BENICAR) 40 mg, oral, Daily    oxyBUTYnin XL (DITROPAN-XL) 10 mg, oral, Nightly    rivaroxaban (XARELTO) 15 mg, Daily with evening meal    simvastatin (ZOCOR) 20 mg, oral, Every Day    tirzepatide (Mounjaro) 5 mg/0.5 mL pen injector INJECT 5MG (1 PEN) UNDER THE SKIN EVERY WEEK       Inpatient Medications:  Scheduled Medications[6]  PRN Medications[7]  Continuous Medications[8]      Review of Systems   Constitutional: Negative.    HENT: Negative.     Eyes: Negative.    Respiratory: Negative.     Cardiovascular: Negative.    Gastrointestinal: Negative.    Endocrine: Negative.    Genitourinary: Negative.    Musculoskeletal: Negative.    Skin:  "Negative.    Allergic/Immunologic: Negative.    Neurological: Negative.    Hematological: Negative.    Psychiatric/Behavioral: Negative.            Physical Exam  Constitutional:       General: She is awake. She is not in acute distress.     Appearance: She is not ill-appearing.   Cardiovascular:      Rate and Rhythm: Bradycardia present. Rhythm irregular.      Pulses: Normal pulses.           Radial pulses are 2+ on the right side and 2+ on the left side.        Dorsalis pedis pulses are 2+ on the right side and 2+ on the left side.      Heart sounds: Normal heart sounds. No murmur heard.  Pulmonary:      Effort: Pulmonary effort is normal.      Breath sounds: Normal breath sounds and air entry.   Abdominal:      General: Bowel sounds are normal.      Palpations: Abdomen is soft.      Tenderness: There is no abdominal tenderness.   Musculoskeletal:      Right lower leg: No edema.      Left lower leg: No edema.   Skin:     General: Skin is warm and dry.   Neurological:      General: No focal deficit present.      Mental Status: She is alert and oriented to person, place, and time.      GCS: GCS eye subscore is 4. GCS verbal subscore is 5. GCS motor subscore is 6.   Psychiatric:         Mood and Affect: Mood normal.         Behavior: Behavior is cooperative.        Sedation Plan    ASA 3     Mallampati class: III.           NPO since last night around 2030    Last Recorded Vitals  Blood pressure 124/57, pulse 51, temperature 35.7 °C (96.3 °F), temperature source Temporal, resp. rate 16, height 1.626 m (5' 4\"), weight 79.4 kg (175 lb 0.7 oz), SpO2 94%.         Vitals from the Past 24 Hours  Heart Rate:  [51]   Temp:  [35.7 °C (96.3 °F)]   Resp:  [16]   BP: (124)/(57)   Height:  [162.6 cm (5' 4\")]   Weight:  [79.4 kg (175 lb 0.7 oz)]   SpO2:  [94 %]          Relevant Results    Labs      CBC:   Recent Labs     05/15/25  1333 04/02/25  1301 08/20/24  1138 02/14/24  1356 02/16/23  1600 12/02/22  1042   WBC 5.6 7.8 6.8 " "6.4 6.4 6.2   HGB 12.5 12.9 12.3 11.9* 11.6* 11.7*   HCT 39.0 39.5 37.8 38.0 36.3 36.8    227 221 200 216 246   .2* 99.7 101* 103* 97 101*     BMP/CMP:   Recent Labs     05/15/25  1333 04/02/25  1301 02/12/25  1450 08/20/24  1138 02/14/24  1356 09/19/23  1057 06/05/23  1249 02/16/23  1600 12/02/22  1042 05/17/22  0952    138 137 138 139 142 140   < > 141 140   K 5.1 5.6* 4.8 4.5 4.4 5.1 5.1   < > 4.6 5.2    104 100 100 103 105 103   < > 102 103   BUN 46* 42* 43* 32* 38* 30* 60*   < > 40* 37*   CREATININE 1.63* 1.71* 1.55* 1.36* 1.60* 1.49* 2.10*   < > 1.51* 1.38*   CO2 27 26 30 28 27 28 28   < > 31 29   CALCIUM 9.3 9.4 9.8 9.6 9.3 9.8 9.9   < > 10.0 10.2   PROT  --   --  6.9 6.5 6.2* 6.7 6.9  --  6.6 6.4   BILITOT  --   --  0.5 0.4  --  0.5 0.5  --  0.4 0.5   ALKPHOS  --   --  57 40  --  41 46  --  40 30*   ALT  --   --  16 11  --  11 25  --  10 13   AST  --   --  18 15  --  14 28  --  14 16   GLUCOSE 103 88 86 101* 93 80 92   < > 115* 95    < > = values in this interval not displayed.      Magnesium: No results for input(s): \"MG\" in the last 91300 hours.  Lipid Panel:   Recent Labs     08/20/24  1138 03/06/23  0948 05/17/22  0952 12/14/21  0745 05/14/21  1055 05/26/20  0904 11/01/19  0927   CHOL 136 127 134 138 164 156 165   HDL 55.7 45.4 50.8 50.1 54.0 47.8 52.2   CHHDL 2.4 2.8 2.6 2.8 3.0 3.3 3.2   VLDL 18 19 18 19 21 24 22   TRIG 88 94 89 94 105 118 108   NHDL 80  --   --   --   --   --   --      Cardiac       No lab exists for component: \"CK\", \"CKMBP\"   Hemoglobin A1C:   Recent Labs     08/20/24  1138 04/09/24  1041 09/19/23  1057 06/05/23  1249 03/06/23  0948 05/17/22  0952 12/14/21  0745 05/14/21  1055 11/11/20  1100 05/26/20  0904 11/01/19  0927   HGBA1C 5.2 5.6 5.2 5.5 5.9* 5.5 7.1* 6.9 6.2 6.5 6.3     TSH/ Free T4:   Recent Labs     08/20/24  1138 03/06/23  0948 05/17/22  0952 05/14/21  1055 11/11/20  1100 05/26/20  0904 11/01/19  0927 01/15/19  0933 07/10/18  0913   TSH 1.35 1.89 " "2.09 2.48 1.68 1.76 1.73 2.26 1.83     Iron:   Recent Labs     08/20/24  1138 02/14/24  1356 02/16/23  1600 11/01/19  0927   FERRITIN 324* 181* 88 57   TIBC 351 390 448*  --    IRONSAT 21* 14* 13*  --      Coag:     ABO: No results found for: \"ABO\"    Past Cardiology Tests (Last 3 Years):    EKG:  Recent Labs     02/25/25  1026 12/14/15  1028   ATRRATE 54 73   VENTRATE 54 73   PRINT 140 130   QRSDUR 88 88   QTCFRED 430 412   QTCCALCB 422 425     Encounter Date: 02/25/25   ECG 12 lead (Clinic Performed)   Result Value    Ventricular Rate 54    Atrial Rate 54    OK Interval 140    QRS Duration 88    QT Interval 446    QTC Calculation(Bazett) 422    P Axis 71    R Axis 63    T Axis 62    QRS Count 9    Q Onset 221    P Onset 151    P Offset 204    T Offset 444    QTC Fredericia 430    Narrative    Sinus bradycardia  Septal infarct (cited on or before 30-MAR-2022)  Abnormal ECG  When compared with ECG of 30-MAR-2022 14:46,  No significant change was found  Confirmed by Cesar Thibodeaux ([id number]) on 2/27/2025 8:29:34 AM     Echo:  Echocardiogram:   Echocardiogram     Study Date:       6/8/2021         PHYSICIAN INTERPRETATION:  Left Ventricle: The left ventricular systolic function is normal, with an estimated ejection fraction of 55-60%. There are no regional wall motion abnormalities. The left ventricular cavity size is normal. Spectral Doppler shows a normal pattern of left ventricular diastolic filling.  Left Atrium: The left atrium is normal in size.  Right Ventricle: The right ventricle is normal in size. There is normal right ventricular global systolic function.  Right Atrium: The right atrium is normal in size.  Aortic Valve: The aortic valve is trileaflet. There is no evidence of aortic valve stenosis.  There is no evidence of aortic valve regurgitation. The peak instantaneous gradient of the aortic valve is 8.9 mmHg. The mean gradient of the aortic valve is 5.0 mmHg.  Mitral Valve: The mitral valve is normal in " "structure. There is mild mitral valve regurgitation.  Tricuspid Valve: The tricuspid valve is structurally normal. There is mild tricuspid regurgitation.  Pulmonic Valve: The pulmonic valve is structurally normal. There is trace pulmonic valve regurgitation.  Pericardium: There is no pericardial effusion noted.  Aorta: The aortic root is normal.  Pulmonary Artery: The tricuspid regurgitant velocity is 2.66 m/s, and with an estimated right atrial pressure of 10 mmHg, the estimated pulmonary artery pressure is mildly elevated with the RVSP at 38.3 mmHg.  Systemic Veins: The inferior vena cava appears to be of normal size.      CONCLUSIONS:  1. The left ventricular systolic function is normal with a 55-60% estimated ejection fraction.  2. There is mild mitral and tricuspid regurgitation.  3. The estimated pulmonary artery pressure is mildly elevated with the RVSP at 38.3 mmHg.      Ejection Fractions:  No results found for: \"EF\"  Cath:  Coronary Angiography: No results found for this or any previous visit from the past 1800 days.    Right Heart Cath: No results found for this or any previous visit from the past 1800 days.    Stress Test:  Nuclear:No results found for this or any previous visit from the past 1800 days.    Metabolic Stress: No results found for this or any previous visit from the past 1800 days.    Cardiac Imaging:  Cardiac Scoring:   CT HEART CALCIUM SCORING WO IV CONTRAST 12/14/2021    FINDINGS:  The score and distribution of calcium in the coronary arteries is as  follows:    LM             0  LAD          4  LCx            0  RCA           0    Total         4    The visualized mid/lower ascending thoracic aorta measures 3.5 Cm in  diameter. The heart is normal in size. No pericardial effusion is  present.    No gross evidence of mediastinal or hilar lymphadenopathy or masses  is identified. The visualized segments of the lungs are  hyperinflated. Indeterminate area of a reticular nodularity in " "the  right midlung posteriorly. This may represent an area of small airway  disease or infection. Also indeterminate tiny nodules in the more  inferior right lower lobe just posterior to the right major fissure  on image number 39        The visualized subdiaphragmatic structures appear intact.    Impression  1. Coronary artery calcium score of  4*.    *Coronary artery calcium scoring may be helpful in predicting the  risk for future coronary heart disease events.  According to the  American College [employer] Foundation Clinical Expert Consensus  Task Force, such testing provides important prognostic information in  patients with more than one coronary heart disease risk factor. The  coronary artery calcium score correlates with the annual risk of a  non-fatal myocardial infarction or coronary heart disease death.    Coronary artery score            Annual Risk    0-99                               0.4%  100-399                          1.3%  >400                              2.4%    These three \"breakpoints\" correspond to lower, intermediate and high  risk states for future coronary events.  Such information should be  used, along with appropriate clinical judgment, to make decisions  regarding the intensity of risk factor management strategies to treat  blood lipids and to modify other non-lipid coronary risk factors.    Reference: [place] P et al. Circulation.  2007; 115:402-426    2. Reticulonodular infiltrate in the right midlung posteriorly may  represent an area of small airway disease or infection/inflammation.  In addition 2 indeterminate nodules in the more inferior right lower  lobe. These may be inflammatory in nature as well. Recommend a  dedicated chest CT in 3-4 months to re-evaluate.    Cardiac MRI: No results found for this or any previous visit from the past 1800 days.         Assessment/Plan  Assessment/Plan   Assessment & Plan  Chronic renal insufficiency    Anticoagulant long-term " use    atrial fibrillation  -RFA with Dr. Thibodeaux on 5/21/25  -plan to dc home on protonix prophylaxis for 30 days post procedure         NP discussed with Dr. Thibodeaux regarding plan of care/ discharge plan      I spent 30 minutes in the professional and overall care of this patient.      Rodriguez Parisi, APRN-CNP, DNP         [1]   Past Medical History:  Diagnosis Date    Atrial fibrillation (Multi)     CKD (chronic kidney disease)     Diabetes mellitus (Multi)     HTN (hypertension)     Hyperlipidemia    [2]   Past Surgical History:  Procedure Laterality Date    CARDIOVERSION      CATARACT EXTRACTION W/  INTRAOCULAR LENS IMPLANT      CHOLECYSTECTOMY      COLONOSCOPY      ECTOPIC PREGNANCY SURGERY      LUMBAR SPINE SURGERY  2010    SKIN CANCER EXCISION      SPINAL FUSION  04/05/2011    [id number]    TUBAL LIGATION     [3]   Social History  Tobacco Use    Smoking status: Never     Passive exposure: Never    Smokeless tobacco: Never   Substance Use Topics    Alcohol use: Never    Drug use: Never   [4]   Family History  Problem Relation Name Age of Onset    Colon cancer Mother      Other (Diabetes Mellitus) Father      Other (Thyroid Disorder) Sister     [5]   Allergies  Allergen Reactions    Codeine Other     UPSET STOMACH    Latex Rash     LATEX GLOVES   [6]   Scheduled medications   Medication Dose Route Frequency   [7]   PRN medications   Medication    dextrose    dextrose    dextrose    dextrose    glucagon    glucagon    glucagon    glucagon   [8]   Continuous Medications   Medication Dose Last Rate    sodium chloride 0.9%  100 mL/hr        bed